# Patient Record
Sex: MALE | Race: WHITE | NOT HISPANIC OR LATINO | Employment: FULL TIME | URBAN - METROPOLITAN AREA
[De-identification: names, ages, dates, MRNs, and addresses within clinical notes are randomized per-mention and may not be internally consistent; named-entity substitution may affect disease eponyms.]

---

## 2019-02-04 ENCOUNTER — OFFICE VISIT (OUTPATIENT)
Dept: GASTROENTEROLOGY | Facility: CLINIC | Age: 49
End: 2019-02-04
Payer: COMMERCIAL

## 2019-02-04 VITALS — DIASTOLIC BLOOD PRESSURE: 82 MMHG | HEART RATE: 69 BPM | SYSTOLIC BLOOD PRESSURE: 122 MMHG | WEIGHT: 227.13 LBS

## 2019-02-04 DIAGNOSIS — K62.5 RECTAL BLEEDING: Primary | ICD-10-CM

## 2019-02-04 PROBLEM — Z12.11 ENCOUNTER FOR SCREENING FOR MALIGNANT NEOPLASM OF COLON: Status: ACTIVE | Noted: 2019-02-04

## 2019-02-04 PROCEDURE — 99244 OFF/OP CNSLTJ NEW/EST MOD 40: CPT | Performed by: INTERNAL MEDICINE

## 2019-02-04 RX ORDER — HYDROCORTISONE ACETATE 25 MG/1
25 SUPPOSITORY RECTAL 2 TIMES DAILY
Qty: 12 SUPPOSITORY | Refills: 0 | Status: SHIPPED | OUTPATIENT
Start: 2019-02-04 | End: 2019-06-04 | Stop reason: SDUPTHER

## 2019-02-04 NOTE — LETTER
February 4, 2019     Temo Perez MD  [address] [facility] [address]    Patient: Mercedes Leyva   YOB: 1970   Date of Visit: 2/4/2019       Dear Dr Katie Lindo:    Thank you for referring Lauraamy Romero to me for evaluation  Below are my notes for this consultation  If you have questions, please do not hesitate to call me  I look forward to following your patient along with you  Sincerely,        Christen Carlos MD        CC: No Recipients  Christen Carlos MD  2/4/2019  2:09 PM  Sign at close encounter  Consultation - [address] [facility]  Mercedes Autumn 1970 51 y o  male     ASSESSMENT @ PLAN:   He is a 58-year-old male with chronic intermittent rectal bleeding secondary to internal hemorrhoids which have been known about for a long time  He has never had a colonoscopy for screening or rectal bleeding  He is here with his wife  1 will do colonoscopy to investigate    2 he will do a high-fiber diet    3 I will give him hydrocortisone suppositories for 2 weeks and I 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    Chief Complaint:   Rectal bleeding and hemorrhoids    HPI:   He is a 58-year-old male that needs a colonoscopy  He has had chronic intermittent rectal bleeding secondary to internal hemorrhoids that time he sits at a desk because he works in [employer]  He is known about hemorrhoids for a long time and knows that it is worsened with chocolate and coffee  He has no diarrhea constipation melena hematochezia nausea vomiting  His father and his sister both had colon polyps  Nobody in the family had Crohn's disease or ulcerative colitis or colon malignancy  REVIEW OF SYSTEMS:     CONSTITUTIONAL: Denies any fever, chills, or rigors  Good appetite, and no recent weight loss  HEENT: No earache or tinnitus  Denies hearing loss or visual disturbances  CARDIOVASCULAR: No chest pain or palpitations     RESPIRATORY: Denies any cough, hemoptysis, shortness of breath or dyspnea on exertion  GASTROINTESTINAL: As noted in the History of Present Illness  GENITOURINARY: No problems with urination  Denies any hematuria or dysuria  NEUROLOGIC: No dizziness or vertigo, denies headaches  MUSCULOSKELETAL: Denies any muscle or joint pain  SKIN: Denies skin rashes or itching  ENDOCRINE: Denies excessive thirst  Denies intolerance to heat or cold  PSYCHOSOCIAL: Denies depression or anxiety  Denies any recent memory loss  History reviewed  No pertinent past medical history  History reviewed  No pertinent surgical history  Social History     Social History    Marital status: Single     Spouse name: N/A    Number of children: N/A    Years of education: N/A     Occupational History    Not on file  Social History Main Topics    Smoking status: Never Smoker    Smokeless tobacco: Never Used    Alcohol use No    Drug use: No    Sexual activity: Not on file     Other Topics Concern    Not on file     Social History Narrative    No narrative on file     Family History   Problem Relation Age of Onset    Diabetes Family      Patient has no known allergies  Current Outpatient Prescriptions   Medication Sig Dispense Refill    hydrocortisone (ANUSOL-HC) 25 mg suppository Insert 1 suppository (25 mg total) into the rectum 2 (two) times a day 12 suppository 0     No current facility-administered medications for this visit  Blood pressure 122/82, pulse 69, weight 103 kg (227 lb 2 oz)      PHYSICAL EXAM:     General Appearance:   Alert, cooperative, no distress, appears stated age    HEENT:   Normocephalic, atraumatic, anicteric      Neck:  Supple, symmetrical, trachea midline, no adenopathy;    thyroid: no enlargement/tenderness/nodules; no carotid  bruit or JVD    Lungs:   Clear to auscultation bilaterally; no rales, rhonchi or wheezing; respirations unlabored    Heart[de-identified]   S1 and S2 normal; regular rate and rhythm; no murmur, rub, or gallop     Abdomen:   Soft, non-tender, non-distended; normal bowel sounds; no masses, no organomegaly    Genitalia:   Deferred    Rectal:   Deferred    Extremities:  No cyanosis, clubbing or edema    Pulses:  2+ and symmetric all extremities    Skin:  Skin color, texture, turgor normal, no rashes or lesions    Lymph nodes:  No palpable cervical, axillary or inguinal lymphadenopathy        No results found for: WBC, HGB, HCT, MCV, PLT  No results found for: GLUCOSE, CALCIUM, NA, K, CO2, CL, BUN, CREATININE  No results found for: ALT, AST, GGT, ALKPHOS, BILITOT  No results found for: INR, PROTIME

## 2019-02-04 NOTE — PROGRESS NOTES
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    Chief Complaint:   Rectal bleeding and hemorrhoids    HPI:   He is a 53-year-old male that needs a colonoscopy  He has had chronic intermittent rectal bleeding secondary to internal hemorrhoids that time he sits at a desk because he works in [employer]  He is known about hemorrhoids for a long time and knows that it is worsened with chocolate and coffee  He has no diarrhea constipation melena hematochezia nausea vomiting  His father and his sister both had colon polyps  Nobody in the family had Crohn's disease or ulcerative colitis or colon malignancy  REVIEW OF SYSTEMS:     CONSTITUTIONAL: Denies any fever, chills, or rigors  Good appetite, and no recent weight loss  HEENT: No earache or tinnitus  Denies hearing loss or visual disturbances  CARDIOVASCULAR: No chest pain or palpitations  RESPIRATORY: Denies any cough, hemoptysis, shortness of breath or dyspnea on exertion  GASTROINTESTINAL: As noted in the History of Present Illness  GENITOURINARY: No problems with urination  Denies any hematuria or dysuria  NEUROLOGIC: No dizziness or vertigo, denies headaches  MUSCULOSKELETAL: Denies any muscle or joint pain  SKIN: Denies skin rashes or itching  ENDOCRINE: Denies excessive thirst  Denies intolerance to heat or cold  PSYCHOSOCIAL: Denies depression or anxiety  Denies any recent memory loss  History reviewed   No pertinent past medical history  History reviewed  No pertinent surgical history  Social History     Social History    Marital status: Single     Spouse name: N/A    Number of children: N/A    Years of education: N/A     Occupational History    Not on file  Social History Main Topics    Smoking status: Never Smoker    Smokeless tobacco: Never Used    Alcohol use No    Drug use: No    Sexual activity: Not on file     Other Topics Concern    Not on file     Social History Narrative    No narrative on file     Family History   Problem Relation Age of Onset    Diabetes Family      Patient has no known allergies  Current Outpatient Prescriptions   Medication Sig Dispense Refill    hydrocortisone (ANUSOL-HC) 25 mg suppository Insert 1 suppository (25 mg total) into the rectum 2 (two) times a day 12 suppository 0     No current facility-administered medications for this visit  Blood pressure 122/82, pulse 69, weight 103 kg (227 lb 2 oz)  PHYSICAL EXAM:     General Appearance:   Alert, cooperative, no distress, appears stated age    HEENT:   Normocephalic, atraumatic, anicteric      Neck:  Supple, symmetrical, trachea midline, no adenopathy;    thyroid: no enlargement/tenderness/nodules; no carotid  bruit or JVD    Lungs:   Clear to auscultation bilaterally; no rales, rhonchi or wheezing; respirations unlabored    Heart[de-identified]   S1 and S2 normal; regular rate and rhythm; no murmur, rub, or gallop     Abdomen:   Soft, non-tender, non-distended; normal bowel sounds; no masses, no organomegaly    Genitalia:   Deferred    Rectal:   Deferred    Extremities:  No cyanosis, clubbing or edema    Pulses:  2+ and symmetric all extremities    Skin:  Skin color, texture, turgor normal, no rashes or lesions    Lymph nodes:  No palpable cervical, axillary or inguinal lymphadenopathy        No results found for: WBC, HGB, HCT, MCV, PLT  No results found for: GLUCOSE, CALCIUM, NA, K, CO2, CL, BUN, CREATININE  No results found for: ALT, AST, GGT, ALKPHOS, BILITOT  No results found for: INR, PROTIME

## 2019-02-15 PROBLEM — K62.5 HEMORRHAGE OF ANUS AND RECTUM: Status: ACTIVE | Noted: 2019-02-04

## 2019-02-18 ENCOUNTER — ANESTHESIA EVENT (OUTPATIENT)
Dept: PERIOP | Facility: HOSPITAL | Age: 49
End: 2019-02-18
Payer: COMMERCIAL

## 2019-02-18 ENCOUNTER — HOSPITAL ENCOUNTER (OUTPATIENT)
Facility: HOSPITAL | Age: 49
Setting detail: OUTPATIENT SURGERY
Discharge: HOME | End: 2019-02-18
Attending: INTERNAL MEDICINE | Admitting: INTERNAL MEDICINE
Payer: COMMERCIAL

## 2019-02-18 ENCOUNTER — ANESTHESIA (OUTPATIENT)
Dept: PERIOP | Facility: HOSPITAL | Age: 49
End: 2019-02-18
Payer: COMMERCIAL

## 2019-02-18 VITALS
HEART RATE: 55 BPM | BODY MASS INDEX: 33.34 KG/M2 | RESPIRATION RATE: 15 BRPM | OXYGEN SATURATION: 96 % | TEMPERATURE: 97.6 F | HEIGHT: 69 IN | WEIGHT: 225.09 LBS | DIASTOLIC BLOOD PRESSURE: 64 MMHG | SYSTOLIC BLOOD PRESSURE: 105 MMHG

## 2019-02-18 DIAGNOSIS — K62.5 HEMORRHAGE OF ANUS AND RECTUM: ICD-10-CM

## 2019-02-18 PROCEDURE — 88305 TISSUE EXAM BY PATHOLOGIST: CPT | Performed by: PATHOLOGY

## 2019-02-18 PROCEDURE — 45385 COLONOSCOPY W/LESION REMOVAL: CPT | Performed by: INTERNAL MEDICINE

## 2019-02-18 RX ORDER — PROPOFOL 10 MG/ML
INJECTION, EMULSION INTRAVENOUS AS NEEDED
Status: DISCONTINUED | OUTPATIENT
Start: 2019-02-18 | End: 2019-02-18 | Stop reason: SURG

## 2019-02-18 RX ORDER — SODIUM CHLORIDE, SODIUM LACTATE, POTASSIUM CHLORIDE, CALCIUM CHLORIDE 600; 310; 30; 20 MG/100ML; MG/100ML; MG/100ML; MG/100ML
125 INJECTION, SOLUTION INTRAVENOUS CONTINUOUS
Status: DISCONTINUED | OUTPATIENT
Start: 2019-02-18 | End: 2019-02-18 | Stop reason: HOSPADM

## 2019-02-18 RX ADMIN — SODIUM CHLORIDE, SODIUM LACTATE, POTASSIUM CHLORIDE, AND CALCIUM CHLORIDE 125 ML/HR: .6; .31; .03; .02 INJECTION, SOLUTION INTRAVENOUS at 10:08

## 2019-02-18 RX ADMIN — PROPOFOL 150 MG: 10 INJECTION, EMULSION INTRAVENOUS at 10:20

## 2019-02-18 RX ADMIN — PROPOFOL 50 MG: 10 INJECTION, EMULSION INTRAVENOUS at 10:24

## 2019-02-18 NOTE — ANESTHESIA PREPROCEDURE EVALUATION
Review of Systems/Medical History  Patient summary reviewed  Chart reviewed  No history of anesthetic complications     Cardiovascular  Exercise tolerance (METS): >4,     Pulmonary       GI/Hepatic            Endo/Other    Obesity    GYN       Hematology   Musculoskeletal       Neurology   Psychology           Physical Exam    Airway    Mallampati score: III  TM Distance: >3 FB  Neck ROM: full     Dental   No notable dental hx     Cardiovascular      Pulmonary      Other Findings        Anesthesia Plan  ASA Score- 2     Anesthesia Type- IV sedation with anesthesia with ASA Monitors  Additional Monitors:   Airway Plan:         Plan Factors-    Induction- intravenous  Postoperative Plan-     Informed Consent- Anesthetic plan and risks discussed with patient  I personally reviewed this patient with the CRNA  Discussed and agreed on the Anesthesia Plan with the LEN Francisco

## 2019-02-18 NOTE — OP NOTE
COLONOSCOPY    PROCEDURE: Colonoscopy/ Polypectomy (Cold Snare)    INDICATIONS: Rectal Bleeding    POST-OP DIAGNOSIS: See the impression below    SEDATION: Monitored anesthesia care, check anesthesia records    PHYSICAL EXAM:  Vitals:    02/18/19 0950   BP: 136/78   Pulse: 61   Resp: 20   Temp: 98 3 °F (36 8 °C)   SpO2: 97%     Body mass index is 33 24 kg/m²  General: NAD  Heart: S1 & S2 normal, RRR  Lungs: CTA, No rales or rhonchi  Abdomen: Soft, nontender, nondistended, good bowel sounds    CONSENT:  Informed consent was obtained for the procedure, including sedation after explaining the risks and benefits of the procedure  Risks including but not limited to bleeding, perforation, infection, aspiration were discussed in detail  Also explained about less than 100%$ sensitivity with the exam and other alternatives  PREPARATION:   EKG tracing, pulse oximetry, blood pressure were monitored throughout the procedure  Patient was identified by myself both verbally and by visual inspection of ID band  DESCRIPTION:   Patient was placed in the left lateral decubitus position and was sedated with the above medication  Digital rectal examination was performed  The colonoscope was introduced in to the anal canal and advanced up to cecum, which was identified by the appendiceal orifice and IC valve  A careful inspection was made as the colonoscope was withdrawn, including a retroflexed view of the rectum; findings and interventions are described below  Appropriate photodocumentation was obtained  The quality of the colonic preparation was excellent  The blood loss was minimal     FINDINGS:  A 1 cm sessile polyp was removed from the sigmoid colon with cold snare polypectomy  Sigmoid diverticulosis was noted  The cecum ascending colon hepatic flexure transverse colon splenic flexure descending colon and rectum were normal   Internal hemorrhoids were noted on retroflexion           IMPRESSIONS:    Polyp status post cold snare polypectomy  Sigmoid diverticulosis  Internal hemorrhoid    RECOMMENDATIONS:  Await pathology  High-fiber diet  Repeat colonoscopy in 5 years    COMPLICATIONS:  None; patient tolerated the procedure well    DISPOSITION: PACU  CONDITION: Stable    Iris Leblanc MD  2/18/2019,10:31 AM

## 2019-02-18 NOTE — ANESTHESIA POSTPROCEDURE EVALUATION
Post-Op Assessment Note    CV Status:  Stable  Pain Score: 0    Pain management: adequate     Mental Status:  Sleepy   Hydration Status:  Stable   PONV Controlled:  None   Airway Patency:  Patent   Post Op Vitals Reviewed: Yes      Staff: CRNA           BP   103/54   Temp      Pulse  54   Resp   20   SpO2   99

## 2019-02-18 NOTE — DISCHARGE INSTR - AVS FIRST PAGE
COLONOSCOPY    PROCEDURE: Colonoscopy/ Polypectomy (Cold Snare)    INDICATIONS: Rectal Bleeding    POST-OP DIAGNOSIS: See the impression below    SEDATION: Monitored anesthesia care, check anesthesia records    PHYSICAL EXAM:  Vitals:    02/18/19 0950   BP: 136/78   Pulse: 61   Resp: 20   Temp: 98 3 °F (36 8 °C)   SpO2: 97%     Body mass index is 33 24 kg/m²  General: NAD  Heart: S1 & S2 normal, RRR  Lungs: CTA, No rales or rhonchi  Abdomen: Soft, nontender, nondistended, good bowel sounds    CONSENT:  Informed consent was obtained for the procedure, including sedation after explaining the risks and benefits of the procedure  Risks including but not limited to bleeding, perforation, infection, aspiration were discussed in detail  Also explained about less than 100%$ sensitivity with the exam and other alternatives  PREPARATION:   EKG tracing, pulse oximetry, blood pressure were monitored throughout the procedure  Patient was identified by myself both verbally and by visual inspection of ID band  DESCRIPTION:   Patient was placed in the left lateral decubitus position and was sedated with the above medication  Digital rectal examination was performed  The colonoscope was introduced in to the anal canal and advanced up to cecum, which was identified by the appendiceal orifice and IC valve  A careful inspection was made as the colonoscope was withdrawn, including a retroflexed view of the rectum; findings and interventions are described below  Appropriate photodocumentation was obtained  The quality of the colonic preparation was excellent  The blood loss was minimal     FINDINGS:  A 1 cm sessile polyp was removed from the sigmoid colon with cold snare polypectomy  Sigmoid diverticulosis was noted  The cecum ascending colon hepatic flexure transverse colon splenic flexure descending colon and rectum were normal   Internal hemorrhoids were noted on retroflexion           IMPRESSIONS:    Polyp status post cold snare polypectomy  Sigmoid diverticulosis  Internal hemorrhoid    RECOMMENDATIONS:  Await pathology  High-fiber diet  Repeat colonoscopy in 5 years    COMPLICATIONS:  None; patient tolerated the procedure well    DISPOSITION: PACU  CONDITION: Stable    Rosio Morales MD  2/18/2019,10:31 AM

## 2019-02-18 NOTE — DISCHARGE INSTRUCTIONS
Colonoscopy   WHAT YOU NEED TO KNOW:   A colonoscopy is a procedure to examine the inside of your colon (intestine) with a scope  Polyps or tissue growths may have been removed during your colonoscopy  It is normal to feel bloated and to have some abdominal discomfort  You should be passing gas  If you have hemorrhoids or you had polyps removed, you may have a small amount of bleeding  DISCHARGE INSTRUCTIONS:   Seek care immediately if:   · You have a large amount of bright red blood in your bowel movements  · Your abdomen is hard and firm and you have severe pain  · You have sudden trouble breathing  Contact your healthcare provider if:   · You develop a rash or hives  · You have a fever within 24 hours of your procedure       · You have not had a bowel movement for 3 days after your procedure  · You have questions or concerns about your condition or care  Activity:   · Do not lift, strain, or run  for 3 days after your procedure  · Rest after your procedure  You have been given medicine to relax you  Do not  drive or make important decisions until the day after your procedure  Return to your normal activity as directed  · Relieve gas and discomfort from bloating  by lying on your right side with a heating pad on your abdomen  You may need to take short walks to help the gas move out  Eat small meals until bloating is relieved  If you had polyps removed: For 7 days after your procedure:  · Do not  take aspirin  · Do not  go on long car rides  Follow up with your healthcare provider as directed:  Write down your questions so you remember to ask them during your visits  © 2017 [employer] is for End User's use only and may not be sold, redistributed or otherwise used for commercial purposes  All illustrations and images included in CareNotes® are the copyrighted property of [employer]  or Juan Luis [employer]ue[employer]    The above information is an  only  It is not intended as medical advice for individual conditions or treatments  Talk to your doctor, nurse or pharmacist before following any medical regimen to see if it is safe and effective for you

## 2019-02-21 ENCOUNTER — TELEPHONE (OUTPATIENT)
Dept: GASTROENTEROLOGY | Facility: CLINIC | Age: 49
End: 2019-02-21

## 2019-02-21 NOTE — TELEPHONE ENCOUNTER
----- Message from Jimena Hernandez MD sent at 2/21/2019 12:03 PM EST -----  pls tell him that the polyp was benign; recall 5yrs

## 2019-04-05 ENCOUNTER — TELEPHONE (OUTPATIENT)
Dept: GASTROENTEROLOGY | Facility: CLINIC | Age: 49
End: 2019-04-05

## 2019-06-04 DIAGNOSIS — K62.5 RECTAL BLEEDING: ICD-10-CM

## 2019-06-04 RX ORDER — HYDROCORTISONE ACETATE 25 MG/1
25 SUPPOSITORY RECTAL 2 TIMES DAILY
Qty: 12 SUPPOSITORY | Refills: 0 | Status: SHIPPED | OUTPATIENT
Start: 2019-06-04 | End: 2021-12-17 | Stop reason: SDUPTHER

## 2021-12-17 DIAGNOSIS — K62.5 RECTAL BLEEDING: ICD-10-CM

## 2021-12-17 RX ORDER — HYDROCORTISONE ACETATE 25 MG/1
25 SUPPOSITORY RECTAL 2 TIMES DAILY
Qty: 12 SUPPOSITORY | Refills: 0 | Status: SHIPPED | OUTPATIENT
Start: 2021-12-17

## 2022-07-06 ENCOUNTER — OFFICE VISIT (OUTPATIENT)
Dept: CARDIOLOGY CLINIC | Facility: HOSPITAL | Age: 52
End: 2022-07-06
Payer: COMMERCIAL

## 2022-07-06 VITALS
WEIGHT: 219 LBS | DIASTOLIC BLOOD PRESSURE: 82 MMHG | BODY MASS INDEX: 30.66 KG/M2 | HEIGHT: 71 IN | SYSTOLIC BLOOD PRESSURE: 120 MMHG | HEART RATE: 62 BPM

## 2022-07-06 DIAGNOSIS — Z91.89 CARDIOVASCULAR RISK FACTOR: Primary | ICD-10-CM

## 2022-07-06 DIAGNOSIS — I44.5 LEFT POSTERIOR FASCICULAR BLOCK: ICD-10-CM

## 2022-07-06 PROCEDURE — 93000 ELECTROCARDIOGRAM COMPLETE: CPT | Performed by: INTERNAL MEDICINE

## 2022-07-06 PROCEDURE — 99243 OFF/OP CNSLTJ NEW/EST LOW 30: CPT | Performed by: INTERNAL MEDICINE

## 2022-07-06 NOTE — PROGRESS NOTES
Javier Hendrix  1970  [id number]  [facility] [place]  [address] [place] [address] Kristina Smeet  [place]  [place] [address]  [phone]    1  Cardiovascular risk factor  POCT ECG    Echo complete w/ contrast if indicated    VAS screening   2  Left posterior fascicular block  POCT ECG    Echo complete w/ contrast if indicated    VAS screening       Discussion/Summary:  He presents today to establish care to talk about his cardiovascular risk  He underwent a lifeline screening which showed abnormalities on EKG  He has a left posterior fascicular block that is asymptomatic  I would recommend an echocardiogram to rule out any underlying structural heart disease  I have also offered him a vascular screening to check carotids and rule out abdominal aortic aneurysm due to family history of this  Lipids are doing well blood pressure is well controlled continue with diet and exercise will follow-up at 1 year  Interval History:  58-year-old gentleman with no significant past medical history presents to establish with me for cardiovascular risk assessment  I took care of his father several years ago and he had a history of valvular disease and AAA and the patient wanted to have his own risk factors for these assessed  He has an excellent functional capacity denies any exertional symptoms  There has been no chest pain, shortness of breath, palpitations, lightheadedness, dizziness, or syncope  There has been no lower extremity edema, PND, orthopnea      Medical Problems             Problem List     Rectal bleeding    Encounter for screening for malignant neoplasm of colon    Overview Signed 2/4/2019  2:12 PM by Yosvany Lee     Added automatically from request for surgery [id number]           Hemorrhage of anus and rectum    Overview Signed 2/15/2019 11:45 AM by Yosvany Lee     Added automatically from request for surgery [id number]           Cardiovascular risk factor    Left posterior fascicular block              History reviewed  No pertinent past medical history  Social History     Socioeconomic History    Marital status: /Civil Union     Spouse name: Not on file    Number of children: Not on file    Years of education: Not on file    Highest education level: Not on file   Occupational History    Not on file   Tobacco Use    Smoking status: Never Smoker    Smokeless tobacco: Never Used   Substance and Sexual Activity    Alcohol use: No    Drug use: No    Sexual activity: Not on file   Other Topics Concern    Not on file   Social History Narrative    Not on file     Social Determinants of Health     Financial Resource Strain: Not on file   Food Insecurity: Not on file   Transportation Needs: Not on file   Physical Activity: Not on file   Stress: Not on file   Social Connections: Not on file   Intimate Partner Violence: Not on file   Housing Stability: Not on file      Family History   Problem Relation Age of Onset    Diabetes Family      Past Surgical History:   Procedure Laterality Date    AK COLONOSCOPY FLX DX W/COLLJ [facility] WHEN PFRMD N/A 2/18/2019    Procedure: COLONOSCOPY;  Surgeon: Rahat Cristina MD;  Location: [facility]; Service: Gastroenterology       Current Outpatient Medications:     hydrocortisone (ANUSOL-HC) 25 mg suppository, Insert 1 suppository (25 mg total) into the rectum 2 (two) times a day, Disp: 12 suppository, Rfl: 0  No Known Allergies    Labs:     Chemistry    No results found for: NA, K, CL, CO2, BUN, CREATININE No results found for: CALCIUM, ALKPHOS, AST, ALT, BILITOT         No results found for: CHOL  No results found for: HDL  No results found for: LDLCALC  No results found for: TRIG  No results found for: CHOLHDL    Imaging: No results found  ECG:  Normal sinus rhythm left posterior fascicular block      Review of Systems   Constitutional: Negative  HENT: Negative  Eyes: Negative  Cardiovascular: Negative  Respiratory: Negative  Endocrine: Negative  Hematologic/Lymphatic: Negative  Skin: Negative  Musculoskeletal: Negative  Gastrointestinal: Negative  Genitourinary: Negative  Neurological: Negative  Psychiatric/Behavioral: Negative  All other systems reviewed and are negative  Vitals:    07/06/22 1458   BP: 120/82   Pulse: 62     Vitals:    07/06/22 1458   Weight: 99 3 kg (219 lb)     Height: 5' 11" (180 3 cm)   Body mass index is 30 54 kg/m²  Physical Exam:  Vital signs reviewed  General appearance:  Appears stated age, alert, well appearing and in no distress  HEENT:  PERRLA, EOMI, no scleral icterus, no conjunctival pallor  NECK:  Supple, No elevated JVP, no thyromegaly, no carotid bruits, no JVD  HEART:  Regular rate and rhythm, normal S1/S2, no S3/S4, no murmur or rub, PMI nondisplaced  LUNGS:  Clear to auscultation bilaterally, no wheezes rales or rhonchi  ABDOMEN:  Soft, non-tender, positive bowel sounds, no rebound or guarding, no organomegaly   EXTREMITIES:  Normal range of motion  No clubbing or cyanosis   No edema  VASCULAR:  Normal pedal pulses   SKIN: No lesions or rashes on exposed skin  NEURO:  CN II-XII intact, no focal deficits

## 2022-07-21 ENCOUNTER — HOSPITAL ENCOUNTER (OUTPATIENT)
Dept: NON INVASIVE DIAGNOSTICS | Facility: CLINIC | Age: 52
Discharge: HOME | End: 2022-07-21
Payer: COMMERCIAL

## 2022-07-21 VITALS
BODY MASS INDEX: 30.66 KG/M2 | HEART RATE: 57 BPM | DIASTOLIC BLOOD PRESSURE: 82 MMHG | WEIGHT: 219 LBS | HEIGHT: 71 IN | SYSTOLIC BLOOD PRESSURE: 120 MMHG

## 2022-07-21 DIAGNOSIS — Z91.89 CARDIOVASCULAR RISK FACTOR: ICD-10-CM

## 2022-07-21 DIAGNOSIS — I44.5 LEFT POSTERIOR FASCICULAR BLOCK: ICD-10-CM

## 2022-07-21 LAB
AORTIC ROOT: 3 CM
APICAL FOUR CHAMBER EJECTION FRACTION: 64 %
AV LVOT PEAK GRADIENT: 6 MMHG
AV PEAK GRADIENT: 7 MMHG
E WAVE DECELERATION TIME: 234 MS
FRACTIONAL SHORTENING: 35 % (ref 28–44)
INTERVENTRICULAR SEPTUM IN DIASTOLE (PARASTERNAL SHORT AXIS VIEW): 0.9 CM
INTERVENTRICULAR SEPTUM: 0.9 CM (ref 0.6–1.1)
LAAS-AP2: 19.3 CM2
LAAS-AP4: 16.9 CM2
LEFT ATRIUM AREA SYSTOLE SINGLE PLANE A4C: 17.2 CM2
LEFT ATRIUM SIZE: 3.4 CM
LEFT INTERNAL DIMENSION IN SYSTOLE: 3 CM (ref 2.1–4)
LEFT VENTRICULAR INTERNAL DIMENSION IN DIASTOLE: 4.6 CM (ref 3.5–6)
LEFT VENTRICULAR POSTERIOR WALL IN END DIASTOLE: 0.8 CM
LEFT VENTRICULAR STROKE VOLUME: 64 ML
LVSV (TEICH): 64 ML
MITRAL REGURGITATION PEAK VELOCITY: 5.18 M/S
MITRAL VALVE MEAN INFLOW VELOCITY: 4.47 M/S
MITRAL VALVE REGURGITANT PEAK GRADIENT: 107 MMHG
MV E'TISSUE VEL-LAT: 10 CM/S
MV PEAK A VEL: 0.88 M/S
MV PEAK E VEL: 114 CM/S
MV STENOSIS PRESSURE HALF TIME: 68 MS
MV VALVE AREA P 1/2 METHOD: 3.24 CM2
RIGHT ATRIUM AREA SYSTOLE A4C: 16 CM2
RIGHT VENTRICLE ID DIMENSION: 3.8 CM
SL CV DOP CALC MV VTI RETROGRADE: 219.5 CM
SL CV LEFT ATRIUM LENGTH A2C: 5.3 CM
SL CV LV EF: 60
SL CV MV MEAN GRADIENT RETROGRADE: 85 MMHG
SL CV PED ECHO LEFT VENTRICLE DIASTOLIC VOLUME (MOD BIPLANE) 2D: 99 ML
SL CV PED ECHO LEFT VENTRICLE SYSTOLIC VOLUME (MOD BIPLANE) 2D: 35 ML

## 2022-07-21 PROCEDURE — 93306 TTE W/DOPPLER COMPLETE: CPT

## 2022-07-21 PROCEDURE — 93306 TTE W/DOPPLER COMPLETE: CPT | Performed by: INTERNAL MEDICINE

## 2022-07-28 ENCOUNTER — HOSPITAL ENCOUNTER (OUTPATIENT)
Dept: VASCULAR ULTRASOUND | Facility: HOSPITAL | Age: 52
Discharge: HOME | End: 2022-07-28
Payer: COMMERCIAL

## 2022-07-28 DIAGNOSIS — Z91.89 CARDIOVASCULAR RISK FACTOR: ICD-10-CM

## 2022-07-28 DIAGNOSIS — I44.5 LEFT POSTERIOR FASCICULAR BLOCK: ICD-10-CM

## 2022-07-28 PROCEDURE — 93978 VASCULAR STUDY: CPT | Performed by: SURGERY

## 2022-07-28 PROCEDURE — 93922 UPR/L XTREMITY ART 2 LEVELS: CPT | Performed by: SURGERY

## 2022-07-28 PROCEDURE — 93922 UPR/L XTREMITY ART 2 LEVELS: CPT

## 2022-07-28 PROCEDURE — 93880 EXTRACRANIAL BILAT STUDY: CPT | Performed by: SURGERY

## 2023-04-14 PROBLEM — R42 DIZZINESS: Status: ACTIVE | Noted: 2023-04-14

## 2023-04-14 PROBLEM — R42 LIGHTHEADED: Status: ACTIVE | Noted: 2023-04-14

## 2023-05-16 ENCOUNTER — CLINICAL SUPPORT (OUTPATIENT)
Dept: CARDIOLOGY CLINIC | Facility: CLINIC | Age: 53
End: 2023-05-16

## 2023-05-16 ENCOUNTER — TELEPHONE (OUTPATIENT)
Dept: CARDIOLOGY CLINIC | Facility: CLINIC | Age: 53
End: 2023-05-16

## 2023-05-16 DIAGNOSIS — I44.5 LEFT POSTERIOR FASCICULAR BLOCK: ICD-10-CM

## 2023-05-16 DIAGNOSIS — R42 LIGHTHEADED: ICD-10-CM

## 2023-05-16 DIAGNOSIS — R42 DIZZINESS: ICD-10-CM

## 2023-07-25 ENCOUNTER — OFFICE VISIT (OUTPATIENT)
Dept: NEUROLOGY | Facility: CLINIC | Age: 53
End: 2023-07-25
Payer: COMMERCIAL

## 2023-07-25 VITALS
BODY MASS INDEX: 29.96 KG/M2 | HEART RATE: 67 BPM | WEIGHT: 214 LBS | HEIGHT: 71 IN | DIASTOLIC BLOOD PRESSURE: 70 MMHG | SYSTOLIC BLOOD PRESSURE: 110 MMHG

## 2023-07-25 DIAGNOSIS — R42 DIZZINESSES: Primary | ICD-10-CM

## 2023-07-25 PROCEDURE — 99204 OFFICE O/P NEW MOD 45 MIN: CPT | Performed by: PSYCHIATRY & NEUROLOGY
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
## 2023-10-30 ENCOUNTER — EVALUATION (OUTPATIENT)
Age: 53
End: 2023-10-30
Payer: COMMERCIAL

## 2023-10-30 DIAGNOSIS — R42 DIZZINESSES: Primary | ICD-10-CM

## 2023-10-30 PROCEDURE — 97166 OT EVAL MOD COMPLEX 45 MIN: CPT

## 2023-10-30 PROCEDURE — 97530 THERAPEUTIC ACTIVITIES: CPT

## 2023-10-30 NOTE — PROGRESS NOTES
OCCUPATIONAL THERAPY INITIAL EVALUATION    Today's Date: 10/30/2023  Patient Name: Juvenal Stroud  : 1970  MRN: [id number]  Referring Provider: Yisel Marquez DO  Dx: Dizzinesses [R42]    SKILLED ANALYSIS:  Pt is a 47year old male presenting to OP OT s/p dizziness with unknown cause. Pt currently reports difficulty with dizziness moderately effecting his everyday routine. He states the dizziness has significantly decreased since onset in 2022 post COVID 19 diagnoses. Pt reports dizziness a few times per month compared to daily in prior months. Pt participating in OT eval due to lazy eye beginning approximately 30 years and states his eyes have adapted to to complete daily routines independently. Pt is demonstrating deficits based on clinical observation and the following assessments: cross cover test: exophoria, oculomotor coordination. Post assessments pt demonstrating the following: exophoria, min slow saccadic eye movements. OP OT not recommended at this time due to patient's ability to complete full time work tasks, daily routines independently and min impaired vision screen. Oculomotor coordination HEP provided and practiced to complete 2x's per day everyday of the week. Findings and recommendations discussed with pt, and they are in agreement. Referral to PT vestibular therapy. Educated pt on charges of insurance and OP OT services. POC expires Auth Status Total   Visits  Start date  Expiration date PT/OT + Visit Limit?  Co-Insurance   10/30/23 Not required  10/30/23  90 Yes 30%                                           Visit/Unit Tracking  AUTH Status: not required Date 10/30              Visits  Authed:  Used 1               Remaining                  PLAN OF CARE START:10/30/23  PLAN OF CARE END: 10/30/2023  PROGRESS NOTE DUE: n/a  FREQUENCY: n/a  PRECAUTIONS n/a  DIAGNOSIS: Dizziness  VISITS: 1 (eval)    Subjective    Mechanism of Injury  As per recent neurology visit, "Patient is a 68-year-old male who presents to the clinic today as a result of dizziness. At this time, patient has already been to cardiology, ENT, and optometry. All cardiac work-up has been pretty much negative for any underlying cardiovascular risk factors. Patient has a significant past medical history of a left lazy eye and COVID in January which kind of prompted the initiation of the dizziness. On neurological examination, patient's exam is nonfocal except for mild Romberg sign and unable to walk in tandem. During this visit, orthostatic vitals were also obtained and they were positive. Considering patient's extensive work-up, nonfocal examination, and positive orthostatic vitals, the dizziness that the patient is experiencing seems to be more dysautonomia secondary to his COVID and his lazy eye. Since patient works from home and is always looking at a computer screen, patient recommended to get occupational therapy for eye exercises along with adequate hydration and nutritional intake."    PATIENT GOAL: "To improve dizziness"    HISTORY OF PRESENT ILLNESS:   Pt is a 47 y.o. male who was referred to Occupational Therapy s/p  Ladarius [R42]. PMH: No past medical history on file. Past Surgical Hx:   Past Surgical History:   Procedure Laterality Date    [place] COLONOSCOPY FLX DX W/COLLJ SPEC WHEN PFRMD N/A 2/18/2019    Procedure: COLONOSCOPY;  Surgeon: Fito Bourgeois MD;  Location: [facility];   Service: Gastroenterology        Objective        Impairment Observations:     Comments                                        VISION SCREEN         Progressive glasses          Symptoms Include Mild inconsistent dizziness, tinnitus    Last Eye Exam January 2023           Visual Acuity (near) R eye  20/50    L eye 20/40    Binocularity (far) Exophoria     Convergence  Diplopia reported at 6 inches, recovery at 5 inches     Unipower Battery             Alignment  OD suppression, exo posture    Mobility             Pursuits smooth in all planes            Saccades smooth in all planes, min slowness            Range of Motion [facility]    Visual perceptual midline             Midline [facility]            [facility]  [facility]

## 2023-11-07 ENCOUNTER — EVALUATION (OUTPATIENT)
Age: 53
End: 2023-11-07

## 2023-11-07 DIAGNOSIS — R42 DIZZINESS: Primary | ICD-10-CM

## 2023-11-07 PROCEDURE — 97161 PT EVAL LOW COMPLEX 20 MIN: CPT | Performed by: PHYSICAL THERAPIST

## 2023-11-07 NOTE — PROGRESS NOTES
PT Evaluation                     Today's date: 2023  Patient name: Colletta Jericho  : 1970  MRN: [id number]  Referring provider: Girish Curiel DO  Dx:   Encounter Diagnosis     ICD-10-CM    1. Dizziness  R42             Assessment  Assessment details: Patient is a 47 y.o. Male who presents to skilled outpatient PT with dizziness. Upon examination patient presents with normal finding for positional testing and vestibular testing. Recommend possible VNG to look at the nerve it self. Therapy is not indicated at this time. Pt agreeable to PT recommendation    Patient verbalized understanding of POC. Please contact me if you have any questions or recommendations. Thank you for the referral and the opportunity to share in [address] [place] [address]. Subjective Evaluation    History of Present Illness  Mechanism of injury: Pt reports having dizziness 11 months ago that started post COVID. Had it 3 times a day. Has followed up with ENT, Cardio and Neuro. Pt reports findings from those specialist noted normal function. Saw neuro MD last week, had lazy eye when growing up with referral vision therapy. OT referred to PT, with dizziness once a week if that with random activity.        Dizziness Subjective  How long does dizziness last: 5 minutes   How would you describe the dizziness: passing out sensation, white out effect  Rolling in bed: No  Supine to/from sit: No  Recent hearing loss: No  Tinnitus: Yes  Aural fullness/ear pain: No  Vision changes: Yes  History of recent viral infections: Yes  History of migraines: No    Red Flag Screen  - Numbness: No  - Tingling: No  - Weakness: No  - Unilateral hearing loss: No  - Slurred speech: No  - Progressive hearing loss: No  - Tremors: No  - Poor coordination: No  - UMN signs: No  - LoC: No  - Rigidity: No  - Visual field loss: No  - Memory loss: No  - CN dysfunction: No  - Vertical nystagmus: No    Pain  Current pain ratin/10  At best pain ratin/10  At worst pain ratin/10  Location: na  Aggravating factors: na    Social Support  Employment status: computer work   Hand dominance: right     Treatments  Previous treatment: OT screen with normal findings   Current treatment: none  Diagnostic Testing: na      Objective     Vestibular Objective  Cervical Spine AROM:  - Flexion: WFL no pain  - Extension: WFL no pain  - R Rotation: WFL no pain  - L Rotation: WFL no pain  - R Lateral Flexion: WFL no pain  - L Lateral Flexion: WFL no pain    Integrity Testing  - mVBI: denies any passing out sensation   - Sharp Tegan: intact   - Alar Stability Test: intact     Oculomotor Screen  - Baseline Symptoms: 0/10  - Baseline Observation: negative   - Gaze Holding Nystagmus: H: Normal Dizziness: 0/10, Observation: WNL  - Spontaneous Nystagmus Room Light: H: Normal Dizziness: 0/10, Observation: WNL  - Smooth Pursuits (central): H: Normal and V: Normal Dizziness: 0/10, Observation: WNL  - Saccades (central): H: Normal and V: Normal Dizziness: 0/10, Observation: WNL  - Near Point Convergence (normal: < 4"/10 cm - central): H: Abnormal Dizziness: 0/10, Observation: right eye unable to coordinate   - VORx1: H: Normal and V: Normal Dizziness: 0/10, Observation: noted physical sway for 1-2 seconds but able to maintain eye contact   - VOR Cancel (central): H: Normal and V: Normal Dizziness: 0/10, Observation: WNL  - Head Thrust (moderate to severe hypofunction): H: Normal Dizziness: 0/10, Observation: able to maintain focus   - Head Shaking Test (mild hypofunction): H: Normal Dizziness: 0/10, Observation: WNL     BPPV Screen  - L Orefield-Hallpike: negative x 2   - R Orefield-Hallpike: negative x 2   - L Log Roll: negative x 2   - R Log Roll: negative x 2        Outcome Measures Initial Eval  2023        mCTSIB  - FTEO (firm)  - FTEC (firm)  - FTEO (foam)  - FTEC (foam)   30 sec  30 sec  30 sec  30 sec                 FGA 26/30        [address] 12/100        JPET                                                           Precautions: standard   No past medical history on file.

## 2023-11-16 ENCOUNTER — TELEPHONE (OUTPATIENT)
Dept: NEUROLOGY | Facility: CLINIC | Age: 53
End: 2023-11-16

## 2023-11-16 NOTE — TELEPHONE ENCOUNTER
Pt called in asking for Dr. Gemini Anthony to place an order for a CT scan or whatever test she recommended. Pt has seen PT and OT. Please assist and call pt when its placed please.

## 2023-11-17 NOTE — TELEPHONE ENCOUNTER
received vm from 11/15 at 2:43pm- GIA Gonzalez, this is Negrita Brady, date of birth 1970. I had a, a, a initial meeting with Dr. Bessy Patel a couple of months ago. Now I followed up with OT and PT and I'm still having the symptoms. So I just wanted to talk to somebody about next steps. I believe they had mentioned, possibly a CT scan to get a baseline brain scan. You know, after I went to the PT/OT assessment, so give me a call back just to just wanted to inquire about that at [phone]. It's Negrita Brady at [phone].  Thanks.  --------------------------------------------  Please advise

## 2023-11-23 DIAGNOSIS — R42 DIZZINESS: Primary | ICD-10-CM

## 2023-12-04 ENCOUNTER — PREP FOR PROCEDURE (OUTPATIENT)
Dept: GASTROENTEROLOGY | Facility: CLINIC | Age: 53
End: 2023-12-04

## 2023-12-04 ENCOUNTER — OFFICE VISIT (OUTPATIENT)
Dept: CARDIOLOGY CLINIC | Facility: CLINIC | Age: 53
End: 2023-12-04
Payer: COMMERCIAL

## 2023-12-04 ENCOUNTER — TELEPHONE (OUTPATIENT)
Dept: GASTROENTEROLOGY | Facility: CLINIC | Age: 53
End: 2023-12-04

## 2023-12-04 VITALS
BODY MASS INDEX: 30.66 KG/M2 | HEIGHT: 71 IN | OXYGEN SATURATION: 98 % | DIASTOLIC BLOOD PRESSURE: 72 MMHG | HEART RATE: 60 BPM | SYSTOLIC BLOOD PRESSURE: 118 MMHG | WEIGHT: 219 LBS

## 2023-12-04 DIAGNOSIS — Z91.89 CARDIOVASCULAR RISK FACTOR: ICD-10-CM

## 2023-12-04 DIAGNOSIS — R42 DIZZINESS: ICD-10-CM

## 2023-12-04 DIAGNOSIS — I44.5 LEFT POSTERIOR FASCICULAR BLOCK: Primary | ICD-10-CM

## 2023-12-04 DIAGNOSIS — Z12.11 SCREENING FOR COLON CANCER: Primary | ICD-10-CM

## 2023-12-04 PROCEDURE — 93000 ELECTROCARDIOGRAM COMPLETE: CPT | Performed by: INTERNAL MEDICINE

## 2023-12-04 PROCEDURE — 99214 OFFICE O/P EST MOD 30 MIN: CPT | Performed by: INTERNAL MEDICINE

## 2023-12-04 NOTE — TELEPHONE ENCOUNTER
12/04/23  Screened by: Rose Pang MA    Referring Provider recall    Pre- Screening: There is no height or weight on file to calculate BMI. Has patient been referred for a routine screening Colonoscopy? yes  Is the patient between 43-73 years old? yes      Previous Colonoscopy yes   If yes:    Date: 5 years ago    Facility:     Reason:       SCHEDULING STAFF: If the patient is between 39yrs-51yrs, please advise patient to confirm benefits/coverage with their insurance company for a routine screening colonoscopy, some insurance carriers will only cover at [address] [place] [address] or older. If the patient is over 66years old, please schedule an office visit. Does the patient want to see a Gastroenterologist prior to their procedure OR are they having any GI symptoms? no    Has the patient been hospitalized or had abdominal surgery in the past 6 months? no    Does the patient use supplemental oxygen? no    Does the patient take Coumadin, Lovenox, Plavix, Elliquis, Xarelto, or other blood thinning medication? no    Has the patient had a stroke, cardiac event, or stent placed in the past year? no    SCHEDULING STAFF: If patient answers NO to above questions, then schedule procedure. If patient answers YES to above questions, then schedule office appointment. If patient is between 45yrs - 49yrs, please advise patient that we will have to confirm benefits & coverage with their insurance company for a routine screening colonoscopy.

## 2023-12-04 NOTE — TELEPHONE ENCOUNTER
Scheduled date of colonoscopy (as of today): 02/19/24  Physician performing colonoscopy: magaly  Location of colonoscopy: [facility]  Bowel prep reviewed with patient: STANTON/ NAZIA  Instructions reviewed with patient by: EDUAR  Clearances: NONE

## 2023-12-04 NOTE — PROGRESS NOTES
Brandi Flavia  1970  [id number]  [address] [place] [address] [facility] [place] [address]  [phone]    1. Left posterior fascicular block  POCT ECG      2. Cardiovascular risk factor  POCT ECG      3. Dizziness            Discussion/Summary: Been doing well since visit. Lightheadedness and dizziness has completely resolved I think this is mostly due to low blood pressure. He is hydrating well event recorder did not show any significant dysrhythmias. Blood pressure well controlled lipids have been doing well vascular screening was reviewed. No further testing I will see him back in 12 months if he needs a follow-up. Interval History:  50-year-old gentleman with no significant past medical history presents to establish with me for cardiovascular risk assessment. I took care of his father several years ago and he had a history of valvular disease and AAA and the patient wanted to have his own risk factors for these assessed. Overall doing great since her last visit. He remains quite physically active. Denies any chest pain, shortness of breath, palpitations, lightheadedness, dizziness, or fever. There is been no lower extremity edema, PND, orthopnea. Event recorder did not show any abnormalities he had several episodes of lightheadedness while wearing it. Medical Problems                 Problem List       Rectal bleeding    Encounter for screening for malignant neoplasm of colon    Overview Signed 2/4/2019  2:12 PM by Nancy Eric     Added automatically from request for surgery [id number]           Hemorrhage of anus and rectum    Overview Signed 2/15/2019 11:45 AM by Nancy Eric     Added automatically from request for surgery [id number]           Cardiovascular risk factor    Left posterior fascicular block                  No past medical history on file.   Social History     Socioeconomic History    Marital status: /Civil Union Spouse name: Not on file    Number of children: Not on file    Years of education: Not on file    Highest education level: Not on file   Occupational History    Not on file   Tobacco Use    Smoking status: Never    Smokeless tobacco: Never   Substance and Sexual Activity    Alcohol use: No    Drug use: No    Sexual activity: Not on file   Other Topics Concern    Not on file   Social History Narrative    Not on file     Social Determinants of Health     Financial Resource Strain: Not on file   Food Insecurity: Not on file   Transportation Needs: Not on file   Physical Activity: Not on file   Stress: Not on file   Social Connections: Not on file   Intimate Partner Violence: Not on file   Housing Stability: Not on file      Family History   Problem Relation Age of Onset    Diabetes Family      Past Surgical History:   Procedure Laterality Date    [place] COLONOSCOPY FLX DX W/COLLJ [facility] INPATIENT [facility]HABILITATION WHEN PFRMD N/A 2/18/2019    Procedure: COLONOSCOPY;  Surgeon: Kaley Pires MD;  Location: [facility]; Service: Gastroenterology       Current Outpatient Medications:     hydrocortisone (ANUSOL-HC) 25 mg suppository, Insert 1 suppository (25 mg total) into the rectum 2 (two) times a day, Disp: 12 suppository, Rfl: 0  No Known Allergies    Labs:     Chemistry        Component Value Date/Time    K 4.5 04/17/2023 1055     04/17/2023 1055    CO2 27 04/17/2023 1055    BUN 17 04/17/2023 1055    CREATININE 1.09 04/17/2023 1055        Component Value Date/Time    CALCIUM 9.4 04/17/2023 1055    ALKPHOS 64 04/17/2023 1055    AST 21 04/17/2023 1055    ALT 20 04/17/2023 1055            No results found for: "CHOL"  No results found for: "HDL"  No results found for: "LDLCALC"  No results found for: "TRIG"  No results found for: "CHOLHDL"    Imaging: No results found. ECG:  Normal sinus rhythm left posterior fascicular block      Review of Systems   Constitutional: Negative. HENT: Negative. Eyes: Negative.     Cardiovascular: Negative. Respiratory: Negative. Endocrine: Negative. Hematologic/Lymphatic: Negative. Skin: Negative. Musculoskeletal: Negative. Gastrointestinal: Negative. Genitourinary: Negative. Neurological: Negative. Psychiatric/Behavioral: Negative. All other systems reviewed and are negative. Vitals:    12/04/23 0848   BP: 118/72   Pulse: 60   SpO2: 98%     Vitals:    12/04/23 0848   Weight: 99.3 kg (219 lb)     Height: 5' 11" (180.3 cm)   Body mass index is 30.54 kg/m². Physical Exam:  Vital signs reviewed  General:  Alert and cooperative, appears stated age, no acute distress  HEENT:  PERRLA, EOMI, no scleral icterus, no conjunctival pallor  Neck:  No lymphadenopathy, no thyromegaly, no carotid bruits, no elevated JVP  Heart:  Regular rate and rhythm, normal S1/S2, no S3/S4, no murmur, rubs or gallops. PMI nondisplaced  Lungs:  Clear to auscultation bilaterally, no wheezes rales or rhonchi  Abdomen:  Soft, non-tender, positive bowel sounds, no rebound or guarding,   no organomegaly   Extremities:  Normal range of motion.   No clubbing, cyanosis or edema   Vascular:  2+ pedal pulses  Skin:  No rashes or lesions on exposed skin  Neurologic:  Cranial nerves II-XII grossly intact without focal deficits  Psych:  Normal mood and affect

## 2023-12-07 ENCOUNTER — HOSPITAL ENCOUNTER (OUTPATIENT)
Dept: MRI IMAGING | Facility: CLINIC | Age: 53
Discharge: HOME | End: 2023-12-07
Payer: COMMERCIAL

## 2023-12-07 DIAGNOSIS — R42 DIZZINESS: ICD-10-CM

## 2023-12-07 PROCEDURE — G1004 CDSM NDSC: HCPCS

## 2023-12-07 PROCEDURE — 70551 MRI BRAIN STEM W/O DYE: CPT

## 2023-12-10 DIAGNOSIS — K62.5 RECTAL BLEEDING: ICD-10-CM

## 2023-12-11 RX ORDER — HYDROCORTISONE ACETATE 25 MG/1
25 SUPPOSITORY RECTAL 2 TIMES DAILY
Qty: 12 SUPPOSITORY | Refills: 0 | OUTPATIENT
Start: 2023-12-11

## 2023-12-11 NOTE — TELEPHONE ENCOUNTER
Left message for patient to return call and schedule appointment for medication refill, or contact PCP for refill

## 2023-12-13 ENCOUNTER — OFFICE VISIT (OUTPATIENT)
Age: 53
End: 2023-12-13
Payer: COMMERCIAL

## 2023-12-13 VITALS
OXYGEN SATURATION: 99 % | DIASTOLIC BLOOD PRESSURE: 80 MMHG | BODY MASS INDEX: 31.19 KG/M2 | HEART RATE: 73 BPM | RESPIRATION RATE: 16 BRPM | SYSTOLIC BLOOD PRESSURE: 120 MMHG | WEIGHT: 222.8 LBS | HEIGHT: 71 IN

## 2023-12-13 DIAGNOSIS — R73.01 IMPAIRED FASTING GLUCOSE: ICD-10-CM

## 2023-12-13 DIAGNOSIS — R53.83 OTHER FATIGUE: ICD-10-CM

## 2023-12-13 DIAGNOSIS — Z00.00 ANNUAL PHYSICAL EXAM: Primary | ICD-10-CM

## 2023-12-13 DIAGNOSIS — R42 DIZZINESS: ICD-10-CM

## 2023-12-13 DIAGNOSIS — K62.5 RECTAL BLEEDING: ICD-10-CM

## 2023-12-13 DIAGNOSIS — E78.2 MIXED HYPERLIPIDEMIA: ICD-10-CM

## 2023-12-13 DIAGNOSIS — Z11.59 NEED FOR HEPATITIS C SCREENING TEST: ICD-10-CM

## 2023-12-13 DIAGNOSIS — Z12.11 SCREENING FOR COLON CANCER: ICD-10-CM

## 2023-12-13 DIAGNOSIS — Z11.4 SCREENING FOR HIV (HUMAN IMMUNODEFICIENCY VIRUS): ICD-10-CM

## 2023-12-13 PROBLEM — E66.09 CLASS 1 OBESITY DUE TO EXCESS CALORIES WITHOUT SERIOUS COMORBIDITY WITH BODY MASS INDEX (BMI) OF 33.0 TO 33.9 IN ADULT: Status: ACTIVE | Noted: 2018-12-24

## 2023-12-13 PROBLEM — E66.811 CLASS 1 OBESITY DUE TO EXCESS CALORIES WITHOUT SERIOUS COMORBIDITY WITH BODY MASS INDEX (BMI) OF 33.0 TO 33.9 IN ADULT: Status: ACTIVE | Noted: 2018-12-24

## 2023-12-13 PROBLEM — R79.89 ABNORMAL THYROID BLOOD TEST: Status: ACTIVE | Noted: 2018-12-24

## 2023-12-13 PROCEDURE — 99396 PREV VISIT EST AGE 40-64: CPT

## 2023-12-13 RX ORDER — HYDROCORTISONE ACETATE 25 MG/1
25 SUPPOSITORY RECTAL 2 TIMES DAILY
Qty: 12 SUPPOSITORY | Refills: 0 | Status: SHIPPED | OUTPATIENT
Start: 2023-12-13 | End: 2023-12-21 | Stop reason: SDUPTHER

## 2023-12-13 NOTE — PROGRESS NOTES
INTERNAL MEDICINE FOLLOW-UP VISIT  [facility] - [facility] INTERNAL MEDICINE [address]    NAME: Alanis Palomares  AGE: 47 y.o. SEX: male  : 1970     DATE: 2023     Assessment and Plan:   1. Annual physical exam  He eats a well-balanced diet, but is going to weight management to discuss healthy diet. He drinks >60 oz of water a day. He walks his dog everyday 2-3x a day, discussed exercising 30 minutes 5x a week. He sleeps pretty well. He denies any alcohol, tobacco, or drug use. He is UTD with dentist and eye doctor. He works in IT at [employer].     2. Dizziness  It occurs once in a while, maybe 1-2x a month. It lasts a good minute. He has seen ENT, been to OT/PT, cardiology, and neurology and the workups were negative. 3. Mixed hyperlipidemia  Due for lipid panel. 4. Screening for colon cancer  He is scheduled for a colonoscopy in February. 5. Rectal bleeding  Has history of internal hemorrhoids. Needs refill of Anusol. BMI Counseling: Body mass index is 31.07 kg/m². The BMI is above normal. Nutrition recommendations include decreasing portion sizes, encouraging healthy choices of fruits and vegetables, decreasing fast food intake, consuming healthier snacks, limiting drinks that contain sugar, moderation in carbohydrate intake, increasing intake of lean protein, reducing intake of saturated and trans fat and reducing intake of cholesterol. Exercise recommendations include moderate physical activity 150 minutes/week and exercising 3-5 times per week. No pharmacotherapy was ordered. Patient referred to PCP. Rationale for BMI follow-up plan is due to patient being overweight or obese. Depression Screening and Follow-up Plan: Patient was screened for depression during today's encounter. They screened negative with a PHQ-2 score of 0. Return in about 6 months (around 2024).        Chief Complaint:     Chief Complaint   Patient presents with    Physical Exam History of Present Illness:   Patient is a 48 yo male that presents today to establish care. He has no new complaints today. Health Maintenance:  Declines flu shot at this time. Family history of aneurysms, hypothyroidism, heart disease. The following portions of the patient's history were reviewed and updated as appropriate: allergies, current medications, past family history, past medical history, past social history, past surgical history and problem list.     Review of Systems:     Review of Systems   Constitutional:  Negative for chills, fatigue and fever. HENT:  Negative for ear discharge, ear pain, postnasal drip, rhinorrhea, sinus pressure, sinus pain, sore throat, tinnitus and trouble swallowing. Eyes:  Negative for pain, discharge and itching. Respiratory:  Negative for cough, shortness of breath and wheezing. Cardiovascular:  Negative for chest pain, palpitations and leg swelling. Gastrointestinal:  Negative for abdominal pain, constipation, diarrhea, nausea and vomiting. Endocrine: Negative for polydipsia, polyphagia and polyuria. Genitourinary:  Negative for difficulty urinating, frequency, hematuria and urgency. Musculoskeletal:  Negative for arthralgias, joint swelling and myalgias. Skin:  Negative for color change and rash. Allergic/Immunologic: Negative for environmental allergies. Neurological:  Positive for dizziness. Negative for weakness, light-headedness, numbness and headaches. Hematological:  Negative for adenopathy. Psychiatric/Behavioral:  Negative for decreased concentration and sleep disturbance. The patient is not nervous/anxious. Past Medical History:   History reviewed. No pertinent past medical history.      Current Medications:     Current Outpatient Medications:     hydrocortisone (ANUSOL-HC) 25 mg suppository, Insert 1 suppository (25 mg total) into the rectum 2 (two) times a day, Disp: 12 suppository, Rfl: 0     Allergies:   No Known Allergies     Physical Exam:     /80 (BP Location: Left arm, Patient Position: Sitting, Cuff Size: Large)   Pulse 73   Resp 16   Ht 5' 11" (1.803 m)   Wt 101 kg (222 lb 12.8 oz)   SpO2 99%   BMI 31.07 kg/m²     Physical Exam  Vitals and nursing note reviewed. Constitutional:       General: He is awake. Appearance: Normal appearance. He is well-developed, well-groomed and overweight. HENT:      Head: Normocephalic and atraumatic. Right Ear: Hearing and external ear normal.      Left Ear: Hearing and external ear normal.      Nose: Nose normal.      Mouth/Throat:      Lips: Pink. Mouth: Mucous membranes are moist.   Eyes:      General: Lids are normal. Vision grossly intact. Gaze aligned appropriately. Conjunctiva/sclera: Conjunctivae normal.   Neck:      Vascular: No carotid bruit. Trachea: Trachea and phonation normal.   Cardiovascular:      Rate and Rhythm: Normal rate and regular rhythm. Heart sounds: Normal heart sounds, S1 normal and S2 normal. No murmur heard. No friction rub. No gallop. Pulmonary:      Effort: Pulmonary effort is normal.      Breath sounds: Normal breath sounds and air entry. No decreased breath sounds, wheezing, rhonchi or rales. Abdominal:      General: Abdomen is protuberant. Palpations: Abdomen is soft. Musculoskeletal:      Cervical back: Neck supple. Right lower leg: No edema. Left lower leg: No edema. Skin:     General: Skin is warm. Capillary Refill: Capillary refill takes less than 2 seconds. Neurological:      Mental Status: He is alert. Psychiatric:         Attention and Perception: Attention and perception normal.         Mood and Affect: Mood and affect normal.         Speech: Speech normal.         Behavior: Behavior normal. Behavior is cooperative. Thought Content:  Thought content normal.         Cognition and Memory: Cognition and memory normal.         Judgment: Judgment normal. Data:     Laboratory Results: I have personally reviewed the pertinent laboratory results/reports   Radiology/Other Diagnostic Testing Results: I have personally reviewed pertinent reports.       Lu Martines PA-C  [facility] INTERNAL MEDICINE [address] [facility] [address] fair balance

## 2023-12-15 ENCOUNTER — APPOINTMENT (OUTPATIENT)
Dept: LAB | Facility: CLINIC | Age: 53
End: 2023-12-15
Payer: COMMERCIAL

## 2023-12-15 DIAGNOSIS — E78.2 MIXED HYPERLIPIDEMIA: ICD-10-CM

## 2023-12-15 DIAGNOSIS — Z11.59 NEED FOR HEPATITIS C SCREENING TEST: ICD-10-CM

## 2023-12-15 DIAGNOSIS — Z11.4 SCREENING FOR HIV (HUMAN IMMUNODEFICIENCY VIRUS): ICD-10-CM

## 2023-12-15 DIAGNOSIS — R53.83 OTHER FATIGUE: ICD-10-CM

## 2023-12-15 DIAGNOSIS — R73.01 IMPAIRED FASTING GLUCOSE: ICD-10-CM

## 2023-12-15 LAB
CHOLEST SERPL-MCNC: 151 MG/DL
EST. AVERAGE GLUCOSE BLD GHB EST-MCNC: 123 MG/DL
HBA1C MFR BLD: 5.9 %
HCV AB SER QL: NORMAL
HDLC SERPL-MCNC: 52 MG/DL
HIV 1+2 AB+HIV1 P24 AG SERPL QL IA: NORMAL
HIV 2 AB SERPL QL IA: NORMAL
HIV1 AB SERPL QL IA: NORMAL
HIV1 P24 AG SERPL QL IA: NORMAL
LDLC SERPL CALC-MCNC: 68 MG/DL (ref 0–100)
NONHDLC SERPL-MCNC: 99 MG/DL
TRIGL SERPL-MCNC: 155 MG/DL
TSH SERPL DL<=0.05 MIU/L-ACNC: 3 UIU/ML (ref 0.45–4.5)

## 2023-12-15 PROCEDURE — 86803 HEPATITIS C AB TEST: CPT

## 2023-12-15 PROCEDURE — 80061 LIPID PANEL: CPT

## 2023-12-15 PROCEDURE — 84443 ASSAY THYROID STIM HORMONE: CPT

## 2023-12-15 PROCEDURE — 83036 HEMOGLOBIN GLYCOSYLATED A1C: CPT

## 2023-12-15 PROCEDURE — 87389 HIV-1 AG W/HIV-1&-2 AB AG IA: CPT

## 2023-12-15 PROCEDURE — 36415 COLL VENOUS BLD VENIPUNCTURE: CPT

## 2023-12-18 ENCOUNTER — TELEPHONE (OUTPATIENT)
Age: 53
End: 2023-12-18

## 2023-12-18 NOTE — TELEPHONE ENCOUNTER
----- Message from Berkley Martines PA-C sent at 12/18/2023  8:12 AM EST -----  Your A1c, or the measure of your sugars over the past 3 months, is slightly elevated in the prediabetic range.  Try to limit any excess carbs and sugars in your diet.  Your triglycerides are slightly elevated, this can vary over the course of 12 hour  s, we will continue to watch this.  Otherwise all of your labs look good.

## 2023-12-19 ENCOUNTER — OFFICE VISIT (OUTPATIENT)
Dept: BARIATRICS | Facility: CLINIC | Age: 53
End: 2023-12-19

## 2023-12-19 VITALS — BODY MASS INDEX: 31.39 KG/M2 | HEIGHT: 71 IN | WEIGHT: 224.2 LBS

## 2023-12-19 DIAGNOSIS — R63.5 ABNORMAL WEIGHT GAIN: Primary | ICD-10-CM

## 2023-12-19 PROCEDURE — RECHECK

## 2023-12-19 PROCEDURE — WMDI30

## 2023-12-19 NOTE — PROGRESS NOTES
Weight Management Medical Nutrition Assessment  Talha was here today for medical meal planning.  Today he weighs 224.2 and has a goal of 185#.  Has sedentary job and works from home.  Does not eat consistently and does skip meal at times.  Has a tendency to graze rather than have planned meals when he has a busy day.  Recommend that he start food logging.  Reviewed with him.  Reviewed his carb, calorie and protein needs.  Discussed meal planning and interval eating.  Recommend more consistent deliberate exercise.  Discussed meal replacements for the times he would normally skip lunch.  Provided sample meal plan, protein handout, and product samples.     Patient seen by Medical Provider in past 6 months:  yes  Requested to schedule appointment with Medical Provider: No      Anthropometric Measurements  Start Weight (#): 224.2  Current Weight (#): 224.2  TBW % Change from start weight:0%  Ideal Body Weight (#):172  Goal Weight (#):t lose 185  Highest:current wt  Lowest: 180     Weight Loss History  Previous weight loss attempts: self created    Food and Nutrition Related History  Wake up: 9 am   Bed Time:2 am    Food Recall  Breakfast:oatmeal or cerea    Snack:grazes on ham sandwich and what ever is there  Lunch:skips  Snack:grazes on pretzels and dip  Dinner:salmon or steak, salad and starch  Snack:grazes on fig rashid's and cookies      Beverages: water and coffee/tea  Volume of beverage intake: 48 oz    Weekends: Improved, not working and has time plan  Cravings: salty  Trouble area of day:midday    Frequency of Eating out: once a week  Food restrictions:none  Cooking: self   Food Shopping: self    Physical Activity Intake  Activity:Walking with dog (not far or strenuous)  Frequency:daily  Physical limitations/barriers to exercise: none    Estimated Needs  Energy    [employer] Needs: BMR : 1713   1-2# loss weekly sedentary: 1056 - 1556           1-2# loss weekly lightly active: 8957-8684     Maintenance  calories for sedentary activity level: 2056  Protein:94 - 117      (1.2-1.5g/kg IBW)  Fluid: 92     (35mL/kg IBW)    Nutrition Diagnosis  Yes;    Overweight/obesity  related to Excess energy intake as evidenced by  BMI more than normative standard for age and sex (obesity-grade I 30-34.9)       Nutrition Intervention    Nutrition Prescription  Calories:1200 - 1500  Protein:94 - 117  Fluid:92    Nutrition Education:    Calorie controlled menu  Lean protein food choices  Healthy snack options  Food journaling tips      Nutrition Counseling:  Strategies: meal planning, portion sizes, healthy snack choices, hydration, fiber intake, protein intake, exercise, food journal      Monitoring and Evaluation:  Evaluation criteria:  Energy Intake  Meet protein needs  Maintain adequate hydration  Monitor weekly weight  Meal planning/preparation  Food journal   Decreased portions at mealtimes and snacks  Physical activity     Barriers to learning:none  Readiness to change: Action:  (Changing behavior)  Comprehension: good  Expected Compliance: good

## 2023-12-21 ENCOUNTER — TELEPHONE (OUTPATIENT)
Dept: NEUROLOGY | Facility: CLINIC | Age: 53
End: 2023-12-21

## 2023-12-21 DIAGNOSIS — R42 DIZZINESS: Primary | ICD-10-CM

## 2023-12-21 DIAGNOSIS — K62.5 RECTAL BLEEDING: ICD-10-CM

## 2023-12-21 RX ORDER — HYDROCORTISONE ACETATE 25 MG/1
SUPPOSITORY RECTAL
Qty: 12 SUPPOSITORY | Refills: 0 | Status: SHIPPED | OUTPATIENT
Start: 2023-12-21

## 2023-12-21 NOTE — TELEPHONE ENCOUNTER
Patient called to get his MRI brain results and wants to know what the next step is. Please advise

## 2023-12-28 ENCOUNTER — OFFICE VISIT (OUTPATIENT)
Dept: AUDIOLOGY | Age: 53
End: 2023-12-28
Payer: COMMERCIAL

## 2023-12-28 ENCOUNTER — TELEPHONE (OUTPATIENT)
Dept: NEUROLOGY | Facility: CLINIC | Age: 53
End: 2023-12-28

## 2023-12-28 DIAGNOSIS — R42 DIZZINESS: Primary | ICD-10-CM

## 2023-12-28 PROCEDURE — 92537 CALORIC VSTBLR TEST W/REC: CPT | Performed by: AUDIOLOGIST

## 2023-12-28 PROCEDURE — 92567 TYMPANOMETRY: CPT | Performed by: AUDIOLOGIST

## 2023-12-28 PROCEDURE — 92540 BASIC VESTIBULAR EVALUATION: CPT | Performed by: AUDIOLOGIST

## 2023-12-28 NOTE — TELEPHONE ENCOUNTER
Patient was called and I discussed the results of the MRI. Patient states that his dizziness is improving and frequency has decreased as well.     I answered all the questions to the best of my ability.     Patient can follow with Neurology on a PRN basis. I told the patient that he is most welcome to call the office with questions or concerns.

## 2023-12-28 NOTE — PROGRESS NOTES
Videonystagmography (VNG) Evaluation    Name:  Talha Liriano  :  1970  Age:  53 y.o.  MRN:  [id number]  Date of Evaluation: 23     History: Dizziness    Reason for visit: Talha Liriano is seen today at the request of Dr. Dupree for an evaluation of balance. Today, Mr. Liriano reported that onset of symptoms began in 2022 shortly after he had COVID. These were described as intermittent in nature. Episodes were noted to have improved since initial onset leading up through his most recent episodes that occur one or two times per month. Dizziness perception was described as a(n) presyncopal sensation wherein Mr. Liriano sees a white flash and feels like he will pass out.  Typical duration of symptoms was noted to last seconds before subsiding. Episode frequency currently occurs on a once or twice monthly basis, but when it first began occurred several times per day for the first several months.  Mr. Liriano reports a low-level high-pitched bilateral tinnitus that he notices in quiet environments. The tinnitus began approximately the same time as his dizzy episodes. Mr. Liriano has not fallen previously/lost consciousness previously.      Tympanometry:   Right: Type A - normal middle ear pressure and compliance   Left: Type A - normal middle ear pressure and compliance    Oculomotor battery:    Gaze:          Right: Normal        Left: Normal         Up: Normal        Down: Normal      Tracking: Normal    Saccades: Normal     Optokinetic: Normal    Positioning/Positionals:     Nancy Barclay Buford:    Right: Negative      Left: Negative        Positionals:     Sitting: Normal    Supine: Normal    Head Right: 2 degrees leftbeating nystagmus, not clinically significant    Head Left: Normal    Body Right: Did not test    Body Left: Did not test      Calorics:     Bithermal Caloric Irrigation: Normal      VNG IMPRESSIONS:   Normal: No evidence of peripheral or central vestibular pathology indicated by  today's findings.        Recommendations:   1) Follow-up with referring provider to review today's results.  2) Continue to monitor dizziness symptoms. If symptoms worsen or fail to improve prior to follow-up with their referring provider, contact your primary care/or referring provider and/or urgent medical attention should be considered.          Marta Simms., CCC-A  Clinical Audiologist  [facility] AUDIOLOGY  [facility] [phone]

## 2023-12-28 NOTE — TELEPHONE ENCOUNTER
LEFT VOICEMAIL for Patient asking he contact the office. Please contact the Patient to discuss MRI results.

## 2024-01-05 ENCOUNTER — TELEPHONE (OUTPATIENT)
Age: 54
End: 2024-01-05

## 2024-01-05 DIAGNOSIS — H92.09 EAR PAIN, REFERRED, UNSPECIFIED LATERALITY: Primary | ICD-10-CM

## 2024-01-05 NOTE — TELEPHONE ENCOUNTER
Has appt 1/11 w/ audiology    Needs updated script to be seen for that appt    Vm that was left :    Good morning. My name is Merry. I'm calling from Audiology at [facility] in [place]. I'm calling because we have a mutual patient coming in on Thursday the 11th of January for a hearing test and I do need a script for that. The patient's name is Talha Liriano. Date of birth 1970. If you have any questions or would like to reach out, you can reach us at [phone]. Thank you. Landy.

## 2024-01-30 ENCOUNTER — OFFICE VISIT (OUTPATIENT)
Dept: AUDIOLOGY | Age: 54
End: 2024-01-30
Payer: COMMERCIAL

## 2024-01-30 DIAGNOSIS — H92.09 EAR PAIN, REFERRED, UNSPECIFIED LATERALITY: ICD-10-CM

## 2024-01-30 DIAGNOSIS — H90.3 SENSORY HEARING LOSS, BILATERAL: Primary | ICD-10-CM

## 2024-01-30 PROCEDURE — 92557 COMPREHENSIVE HEARING TEST: CPT | Performed by: AUDIOLOGIST

## 2024-01-30 PROCEDURE — 92567 TYMPANOMETRY: CPT | Performed by: AUDIOLOGIST

## 2024-01-30 NOTE — PROGRESS NOTES
HEARING EVALUATION    Name:  Talha Liriano  :  1970  Age:  53 y.o.   MRN:  [id number]  Date of Evaluation: 24     HISTORY:     Tinnitus    Reason for visit: Talha Liriano is being seen today at the request of Dr. Martines for an initial  evaluation of hearing.  Patient reports high-pitched bilateral tinnitus that began in 2022 after having COVID. Dizziness also began at that time for which patient was previously seen by this clinic for a VNG with normal results. The patient continues to experience infrequent episodes of dizziness.  He denies history of ear infections and family history of hearing loss.  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        EVALUATION:    Otoscopic Evaluation:   Right Ear: Unremarkable, canal clear   Left Ear: Unremarkable, canal clear    Tympanometry:   Right Ear: Type A; normal middle ear pressure and static compliance    Left Ear: Type A; normal middle ear pressure and static compliance     Speech Audiometry:  Speech Reception (SRT)   Right Ear: 15 dB HL   Left Ear: 20 dB HL    Word Recognition Scores (WRS):  Right Ear: excellent (100 % correct)     Left Ear: excellent (92 % correct)   Stimuli: [id number]    Pure Tone Audiometry:  Conventional pure tone audiometry from 250 - 8000 Hz  was obtained with good reliability and revealed the following:     Right Ear: Normal to mild sensorineural hearing loss     Left Ear: Normal to mild sensorineural hearing loss      *see attached audiogram    IMPRESSIONS:   Normal to mild sensorineural hearing loss.     RECOMMENDATIONS:  Annual hearing eval, Return to [facility]. for F/U, and Copy to Patient/Caregiver    PATIENT EDUCATION:   The results of today's results and recommendations were reviewed with the patient and his hearing thresholds were explained at length. Treatment options, including amplification and communication strategies, were discussed as  appropriate. The patient voiced understanding of his test results. Questions were addressed and the patient was encouraged to contact our department should concerns arise.    Patient was counseled about possible tinnitus triggers, such as caffeine, sodium, depression, stress, anxiety, and fatigue. Patient was also counseled about the potential causes of tinnitus and methods to adapt and/or cope with the tinnitus, such as the use of sound/noise generators. Other management options including the use of hearing aids and tinnitus re-training therapy were also discussed.  Patient appeared to understand and did not have further questions today.        Marta Simms., CCC-A  Clinical Audiologist  [facility] AUDIOLOGY  [address] [place] [address]  [place] [id number]

## 2024-02-18 ENCOUNTER — ANESTHESIA EVENT (OUTPATIENT)
Dept: GASTROENTEROLOGY | Facility: HOSPITAL | Age: 54
End: 2024-02-18

## 2024-02-19 ENCOUNTER — ANESTHESIA (OUTPATIENT)
Dept: GASTROENTEROLOGY | Facility: HOSPITAL | Age: 54
End: 2024-02-19

## 2024-02-19 ENCOUNTER — HOSPITAL ENCOUNTER (OUTPATIENT)
Dept: GASTROENTEROLOGY | Facility: HOSPITAL | Age: 54
Setting detail: OUTPATIENT SURGERY
Discharge: HOME | End: 2024-02-19
Attending: INTERNAL MEDICINE
Payer: COMMERCIAL

## 2024-02-19 VITALS
SYSTOLIC BLOOD PRESSURE: 106 MMHG | OXYGEN SATURATION: 95 % | TEMPERATURE: 97 F | HEART RATE: 52 BPM | HEIGHT: 71 IN | WEIGHT: 218.26 LBS | DIASTOLIC BLOOD PRESSURE: 71 MMHG | RESPIRATION RATE: 16 BRPM | BODY MASS INDEX: 30.56 KG/M2

## 2024-02-19 DIAGNOSIS — Z12.11 SCREENING FOR COLON CANCER: ICD-10-CM

## 2024-02-19 PROCEDURE — G0105 COLORECTAL SCRN; HI RISK IND: HCPCS | Performed by: INTERNAL MEDICINE

## 2024-02-19 RX ORDER — LIDOCAINE HYDROCHLORIDE 10 MG/ML
INJECTION, SOLUTION EPIDURAL; INFILTRATION; INTRACAUDAL; PERINEURAL AS NEEDED
Status: DISCONTINUED | OUTPATIENT
Start: 2024-02-19 | End: 2024-02-19

## 2024-02-19 RX ORDER — PROPOFOL 10 MG/ML
INJECTION, EMULSION INTRAVENOUS AS NEEDED
Status: DISCONTINUED | OUTPATIENT
Start: 2024-02-19 | End: 2024-02-19

## 2024-02-19 RX ORDER — SODIUM CHLORIDE, SODIUM LACTATE, POTASSIUM CHLORIDE, CALCIUM CHLORIDE 600; 310; 30; 20 MG/100ML; MG/100ML; MG/100ML; MG/100ML
INJECTION, SOLUTION INTRAVENOUS CONTINUOUS PRN
Status: DISCONTINUED | OUTPATIENT
Start: 2024-02-19 | End: 2024-02-19

## 2024-02-19 RX ADMIN — SODIUM CHLORIDE, SODIUM LACTATE, POTASSIUM CHLORIDE, AND CALCIUM CHLORIDE: .6; .31; .03; .02 INJECTION, SOLUTION INTRAVENOUS at 13:47

## 2024-02-19 RX ADMIN — LIDOCAINE HYDROCHLORIDE 50 MG: 10 INJECTION, SOLUTION EPIDURAL; INFILTRATION; INTRACAUDAL; PERINEURAL at 13:52

## 2024-02-19 RX ADMIN — PROPOFOL 30 MG: 10 INJECTION, EMULSION INTRAVENOUS at 13:56

## 2024-02-19 RX ADMIN — PROPOFOL 50 MG: 10 INJECTION, EMULSION INTRAVENOUS at 13:54

## 2024-02-19 RX ADMIN — PROPOFOL 20 MG: 10 INJECTION, EMULSION INTRAVENOUS at 13:58

## 2024-02-19 RX ADMIN — PROPOFOL 100 MG: 10 INJECTION, EMULSION INTRAVENOUS at 13:52

## 2024-02-19 NOTE — ANESTHESIA PREPROCEDURE EVALUATION
"Procedure:  COLONOSCOPY    Relevant Problems   CARDIO   (+) Left posterior fascicular block   (+) Mixed hyperlipidemia      GI/HEPATIC   (+) Hemorrhage of anus and rectum   (+) Rectal bleeding        Physical Exam    Airway    Mallampati score: II  TM Distance: >3 FB  Neck ROM: full     Dental       Cardiovascular      Pulmonary      Other Findings        Anesthesia Plan  ASA Score- 1     Anesthesia Type- IV sedation with anesthesia with ASA Monitors.         Additional Monitors:     Airway Plan:     Comment: Recent labs personally reviewed:  Lab Results       Component                Value               Date                       WBC                      7.45                12/15/2023                 HGB                      14.8                12/15/2023                 PLT                      147 (L)             12/15/2023            Lab Results       Component                Value               Date                       K                        4.2                 12/15/2023                 BUN                      15                  12/15/2023                 CREATININE               1.05                12/15/2023            No results found for: \"PTT\"   No results found for: \"INR\"    Blood type     I, Aria Xiong MD, have personally seen and evaluated the patient prior to anesthetic care.  I have reviewed the pre-anesthetic record, medical history, allergies, medications and any other medical records if appropriate to the anesthetic care.  If a CRNA is involved in the case, I have reviewed the CRNA assessment, if present, and agree. Patient consented for IV Sedation, general anesthesia as back up. Discussed risks of aspiration, IV infiltration, indications for conversion to general anesthesia. All questions and concerns addressed.     .       Plan Factors-Exercise tolerance (METS): >4 METS.    Chart reviewed.   Existing labs reviewed. Patient summary reviewed.    Patient is not a current smoker.  Patient " did not smoke on day of surgery.    Obstructive sleep apnea risk education given perioperatively.        Induction- intravenous.    Postoperative Plan-     Informed Consent- Anesthetic plan and risks discussed with patient.  I personally reviewed this patient with the CRNA. Discussed and agreed on the Anesthesia Plan with the CRNA..

## 2024-02-19 NOTE — H&P
"History and Physical -  [facility]  Talha Liriano 53 y.o. male MRN: [id number]                  HPI: Talha Liriano is a 53 y.o. year old male who presents for colonoscopy for history of colon polyp      REVIEW OF SYSTEMS: Per the HPI, and otherwise unremarkable.    Historical Information   History reviewed. No pertinent past medical history.  Past Surgical History:   Procedure Laterality Date    NH COLONOSCOPY FLX DX W/COLLJ SPEC WHEN PFRMD N/A 2/18/2019    Procedure: COLONOSCOPY;  Surgeon: Josh Wren III, MD;  Location: [facility];  Service: Gastroenterology     Social History   Social History     Substance and Sexual Activity   Alcohol Use No     Social History     Substance and Sexual Activity   Drug Use No     Social History     Tobacco Use   Smoking Status Never   Smokeless Tobacco Never     Family History   Problem Relation Age of Onset    Diabetes Family        Meds/Allergies     (Not in a hospital admission)      No Known Allergies    Objective     Blood pressure 150/90, pulse 57, temperature 98 °F (36.7 °C), temperature source Tympanic, resp. rate 16, height 5' 11\" (1.803 m), weight 99 kg (218 lb 4.1 oz), SpO2 99%.      PHYSICAL EXAM    /90   Pulse 57   Temp 98 °F (36.7 °C) (Tympanic)   Resp 16   Ht 5' 11\" (1.803 m)   Wt 99 kg (218 lb 4.1 oz)   SpO2 99%   BMI 30.44 kg/m²       Gen: NAD  CV: RRR  CHEST: Clear  ABD: soft, NT/ND  EXT: no edema      ASSESSMENT/PLAN:  This is a 53 y.o. year old male here for colonoscopy, and he is stable and optimized for his procedure.        "

## 2024-02-19 NOTE — ANESTHESIA POSTPROCEDURE EVALUATION
Post-Op Assessment Note    CV Status:  Stable  Pain Score: 0    Pain management: adequate       Mental Status:  Alert and awake   Hydration Status:  Euvolemic   PONV Controlled:  Controlled   Airway Patency:  Patent     Post Op Vitals Reviewed: Yes    No anethesia notable event occurred.    Staff: CRNA               BP   98/67   Temp   97.3   Pulse  70   Resp   14   SpO2   100

## 2024-02-21 PROBLEM — Z12.11 ENCOUNTER FOR SCREENING FOR MALIGNANT NEOPLASM OF COLON: Status: RESOLVED | Noted: 2019-02-04 | Resolved: 2024-02-21

## 2024-03-05 ENCOUNTER — CLINICAL SUPPORT (OUTPATIENT)
Dept: BARIATRICS | Facility: CLINIC | Age: 54
End: 2024-03-05

## 2024-03-05 DIAGNOSIS — R63.5 ABNORMAL WEIGHT GAIN: Primary | ICD-10-CM

## 2024-03-05 PROCEDURE — WMDI30

## 2024-03-05 PROCEDURE — RECHECK

## 2024-03-05 NOTE — PROGRESS NOTES
Weight Management Medical Nutrition Assessment  Talha was here today for meal planning.  Today he weighs 225.4 giving him a gain of 1.2 lbs.  He admits that he has had some stress relating to his job and possibly having to move or find a new job.  He is struggling with stress eating and admits that he is still skipping meals and not eating on a regular schedule. He has also been eating later at night and not exercising. Recommend that he not skip meals, start food logging and start walking daily.      Patient seen by Medical Provider in past 6 months:  yes  Requested to schedule appointment with Medical Provider: No        Anthropometric Measurements  Start Weight (#): 224.2  Current Weight (#): 225.4  TBW % Change from start weight:0%  Ideal Body Weight (#):172  Goal Weight (#):t lose 185  Highest:current wt  Lowest: 180      Weight Loss History  Previous weight loss attempts: self created     Food and Nutrition Related History  Wake up: 9 am              Bed Time:2 am     Food Recall  Breakfast:eggs                Snack:  Lunch:skips  Snack:grazes on pretzels and dip  Dinner:salmon or steak, salad and starch  Snack:grazes on fig rashid's and cookies        Beverages: water and coffee/tea  Volume of beverage intake: 48 oz     Weekends: Improved, not working and has time plan  Cravings: salty  Trouble area of day:midday     Frequency of Eating out: once a week  Food restrictions:none  Cooking: self   Food Shopping: self     Physical Activity Intake  Activity:Walking with dog (not far or strenuous)  Frequency:daily  Physical limitations/barriers to exercise: none     Estimated Needs  Energy     [employer] Needs: BMR : 1713   1-2# loss weekly sedentary: 1056 - 1556           1-2# loss weekly lightly active: 5189-1973     Maintenance calories for sedentary activity level: 2056  Protein:94 - 117      (1.2-1.5g/kg IBW)  Fluid: 92     (35mL/kg IBW)     Nutrition Diagnosis  Yes;    Overweight/obesity  related  to Excess energy intake as evidenced by  BMI more than normative standard for age and sex (obesity-grade I 30-34.9)     Nutrition Intervention     Nutrition Prescription  Calories:1200 - 1500  Protein:94 - 117  Fluid:92     Nutrition Education:    Calorie controlled menu  Lean protein food choices  Healthy snack options  Food journaling tips        Nutrition Counseling:  Strategies: meal planning, portion sizes, healthy snack choices, hydration, fiber intake, protein intake, exercise, food journal        Monitoring and Evaluation:  Evaluation criteria:  Energy Intake  Meet protein needs  Maintain adequate hydration  Monitor weekly weight  Meal planning/preparation  Food journal   Decreased portions at mealtimes and snacks  Physical activity      Barriers to learning:none  Readiness to change: Action:  (Changing behavior)  Comprehension: good  Expected Compliance: good

## 2024-03-06 VITALS — WEIGHT: 225.4 LBS | HEIGHT: 71 IN | BODY MASS INDEX: 31.56 KG/M2

## 2024-06-17 ENCOUNTER — TELEPHONE (OUTPATIENT)
Dept: NEUROLOGY | Facility: CLINIC | Age: 54
End: 2024-06-17

## 2024-08-01 ENCOUNTER — OFFICE VISIT (OUTPATIENT)
Age: 54
End: 2024-08-01
Payer: COMMERCIAL

## 2024-08-01 VITALS
OXYGEN SATURATION: 98 % | SYSTOLIC BLOOD PRESSURE: 124 MMHG | BODY MASS INDEX: 30.02 KG/M2 | HEART RATE: 58 BPM | WEIGHT: 214.4 LBS | HEIGHT: 71 IN | DIASTOLIC BLOOD PRESSURE: 80 MMHG

## 2024-08-01 DIAGNOSIS — E66.3 OVERWEIGHT (BMI 25.0-29.9): ICD-10-CM

## 2024-08-01 DIAGNOSIS — R06.83 SNORING: Primary | ICD-10-CM

## 2024-08-01 DIAGNOSIS — G47.19 EXCESSIVE DAYTIME SLEEPINESS: ICD-10-CM

## 2024-08-01 PROCEDURE — 99203 OFFICE O/P NEW LOW 30 MIN: CPT | Performed by: INTERNAL MEDICINE

## 2024-08-01 NOTE — PROGRESS NOTES
Sleep Consultation   Talha Liriano 53 y.o. male MRN: [id number]      Reason for consultation: Snoring    Requesting physician: Lester Dupree MD    Assessment/Plan    Suspected sleep apnea  Mallampati class 4, Body mass index is 29.9 kg/m²., Neck Circumference: 18.5.    He/she is at risk for obstructive sleep apnea based on STOP BANG survey based on snoring, tiredness, age, male gender, increased neck circumference  S/s: Snoring, tiredness, daytime sleepiness unrefreshing sleep  Oglethorpe score: 6, despite this she reports feeling tired and sleepy throughout the day  I discussed in depth the diagnostic studies and treatment options involved with obstructive sleep apnea  I also discussed in depth the risk of leaving sleep apnea untreated including hypertension, heart failure, arrhythmia, MI and stroke.  The patient is agreeable to undergo testing and treatment of obstructive sleep apnea.  He/she understands the pitfalls he/she may encounter along the way and is willing to attempt CPAP treatment.     Plan  Ordered home sleep study  Patient is amenable to CPAP    2.  Snoring  Reports loud snoring    3.  Excessive daytime sleepiness  Oglethorpe score of 6, , despite this she reports feeling tired and sleepy throughout the day    4.  Overweight  Counseled patient on lifestyle modifications including diet and exercise  I explained that weight loss will generally decrease severity of sleep apnea    History of Present Illness   HPI:  Talha Liriano is a 53 y.o. male with PMHx hyperlipidemia who presents for evaluation of suspected sleep apnea.  He reports loud snoring.  He is tired and sleepy throughout the day.  He reports an Oglethorpe score of 6.  He goes to bed at 1 AM and falls asleep quickly.  He wakes up at 8 AM.  He does not feel refreshed when he wakes up.  He wakes up twice a night to urinate.  He feels sleepy especially after lunch.  He has been told that he snores loudly.  He has lost about 6 pounds recently and has  been seeing weight management.  He drinks 3 cups of coffee daily.  He occasionally drinks alcohol.            Review of Systems      Genitourinary none   Cardiology none   Gastrointestinal none   Neurology none   Constitutional none   Integumentary none   Psychiatry none   Musculoskeletal none   Pulmonary none   ENT none   Endocrine none   Hematological none         Historical Information   History reviewed. No pertinent past medical history.  Past Surgical History:   Procedure Laterality Date    DE COLONOSCOPY FLX DX W/COLLJ SPEC WHEN PFRMD N/A 2/18/2019    Procedure: COLONOSCOPY;  Surgeon: Josh Wren III, MD;  Location: [facility];  Service: Gastroenterology     Family History   Problem Relation Age of Onset    Diabetes Family      Social History     Socioeconomic History    Marital status: /Civil Union     Spouse name: Not on file    Number of children: Not on file    Years of education: Not on file    Highest education level: Not on file   Occupational History    Not on file   Tobacco Use    Smoking status: Never    Smokeless tobacco: Never   Substance and Sexual Activity    Alcohol use: No    Drug use: No    Sexual activity: Yes     Partners: Female   Other Topics Concern    Not on file   Social History Narrative    Not on file     Social Determinants of Health     Financial Resource Strain: Not on file   Food Insecurity: Not on file   Transportation Needs: Not on file   Physical Activity: Not on file   Stress: Not on file   Social Connections: Unknown (6/18/2024)    Received from [employer]     How often do you feel lonely or isolated from those around you? (Adult - for ages 18 years and over): Not on file   Intimate Partner Violence: Not on file   Housing Stability: Not on file       Occupational History: IT    Meds/Allergies   No Known Allergies    Home medications:  Prior to Admission medications    Medication Sig Start Date End Date Taking? Authorizing Provider  "  hydrocortisone (ANUSOL-HC) 25 mg suppository Insert 1 suppository into the rectum twice a day 12/21/23  Yes Berkley Martines PA-C       Vitals:   Blood pressure 124/80, pulse 58, height 5' 11\" (1.803 m), weight 97.3 kg (214 lb 6.4 oz), SpO2 98%.,  Body mass index is 29.9 kg/m².  Neck Circumference: 18.5    Physical Exam  General: Awake alert and oriented x 3, conversant without conversational dyspnea, NAD, normal affect  HEENT:  PERRL, Sclera noninjected, nonicteric OU, Nares patent,  no craniofacial abnormalities, Mucous membranes, moist, no oral lesions, normal dentition, Mallampati class 4  NECK:  Trachea midline, no accessory muscle use, no stridor, no cervical or supraclavicular adenopathy, JVP not elevated  CARDIAC: Reg, single s1/S2, no m/r/g  PULM: CTA bilaterally no wheezing, rhonchi or rales  EXT: No cyanosis, no clubbing, no edema, normal capillary refill  NEURO: no focal neurologic deficits, AAOx3, moving all extremities appropriately    Labs: I have personally reviewed pertinent lab results.  Lab Results   Component Value Date    WBC 7.45 12/15/2023    HGB 14.8 12/15/2023    HCT 45.7 12/15/2023    MCV 88 12/15/2023     (L) 12/15/2023      Lab Results   Component Value Date    CALCIUM 9.7 12/15/2023    K 4.2 12/15/2023    CO2 29 12/15/2023     12/15/2023    BUN 15 12/15/2023    CREATININE 1.05 12/15/2023     No results found for: \"IRON\", \"TIBC\", \"FERRITIN\"  No results found for: \"OQZTJFSP05\"  No results found for: \"FOLATE\"      Arterial Blood Gas result:  BERE Husain MD  [facility]   "

## 2024-09-12 ENCOUNTER — HOSPITAL ENCOUNTER (OUTPATIENT)
Dept: CT IMAGING | Facility: HOSPITAL | Age: 54
End: 2024-09-12
Payer: COMMERCIAL

## 2024-09-12 ENCOUNTER — OFFICE VISIT (OUTPATIENT)
Age: 54
End: 2024-09-12
Payer: COMMERCIAL

## 2024-09-12 VITALS
HEART RATE: 86 BPM | OXYGEN SATURATION: 97 % | RESPIRATION RATE: 18 BRPM | DIASTOLIC BLOOD PRESSURE: 70 MMHG | TEMPERATURE: 98.7 F | WEIGHT: 218 LBS | SYSTOLIC BLOOD PRESSURE: 104 MMHG | BODY MASS INDEX: 30.52 KG/M2 | HEIGHT: 71 IN

## 2024-09-12 DIAGNOSIS — R51.9 ACUTE INTRACTABLE HEADACHE, UNSPECIFIED HEADACHE TYPE: Primary | ICD-10-CM

## 2024-09-12 DIAGNOSIS — R51.9 ACUTE INTRACTABLE HEADACHE, UNSPECIFIED HEADACHE TYPE: ICD-10-CM

## 2024-09-12 PROCEDURE — 99214 OFFICE O/P EST MOD 30 MIN: CPT

## 2024-09-12 PROCEDURE — 70450 CT HEAD/BRAIN W/O DYE: CPT

## 2024-09-12 NOTE — PROGRESS NOTES
INTERNAL MEDICINE FOLLOW-UP VISIT  [facility] - [facility] INTERNAL MEDICINE [address]    NAME: Talha Liriano  AGE: 53 y.o. SEX: male  : 1970     DATE: 2024     Assessment and Plan:   1. Acute intractable headache, unspecified headache type  Due to sudden onset, no history of headache, age greater than 50, and headache persisting, will obtain stat CT head without contrast.  If CT is unremarkable, will treat as ocular migraine.  Discussed treatment options which include alternating Tylenol and ibuprofen or Excedrin.  Also discussed sumatriptan for abortive medication and potential adverse effects.  Continue to drink an adequate amount of water.  Will follow-up with CT results.  - CT head wo contrast; Future        No follow-ups on file.       Chief Complaint:     Chief Complaint   Patient presents with    Headache     Since , left side    Dizziness    Nausea      History of Present Illness:   Patient is a 52 yo male that presents today for a headache, dizziness, and nausea that started on .  He notes he woke up on  and throughout the day he started to experience a left-sided headache.  He describes as pressure behind his eye.  It is a throbbing pain that varies in intensity throughout the day.  He does admit to some left eye blurriness, photophobia, nausea, and worsening pain when laying on that side.  He had some slight left-sided dental pain on , but has resolved.  He denies a history of migraines.  He denies any recent falls.  He denies any confusion, and his wife agrees he is at his baseline.  He denies any other upper respiratory symptoms.  He took Tylenol and Advil with no relief.  He does admit to an increase in stress lately.    The following portions of the patient's history were reviewed and updated as appropriate: allergies, current medications, past family history, past medical history, past social history, past surgical history and problem  "list.     Review of Systems:     Review of Systems   Constitutional:  Negative for chills and fever.   HENT:  Positive for dental problem (Sunday). Negative for ear discharge, ear pain, rhinorrhea, sinus pressure, sinus pain, sore throat and trouble swallowing.    Eyes:  Positive for photophobia. Negative for visual disturbance.   Respiratory:  Negative for cough, shortness of breath and wheezing.    Cardiovascular:  Negative for chest pain.   Gastrointestinal:  Negative for abdominal pain, constipation, diarrhea, nausea and vomiting.   Genitourinary:  Negative for difficulty urinating and dysuria.   Musculoskeletal:  Negative for arthralgias and myalgias.   Skin:  Negative for rash.   Neurological:  Positive for dizziness, light-headedness and headaches.        Past Medical History:   History reviewed. No pertinent past medical history.     Current Medications:     Current Outpatient Medications:     hydrocortisone (ANUSOL-HC) 25 mg suppository, Insert 1 suppository into the rectum twice a day (Patient not taking: Reported on 9/12/2024), Disp: 12 suppository, Rfl: 0     Allergies:   No Known Allergies     Physical Exam:     /70 (BP Location: Left arm, Patient Position: Sitting, Cuff Size: Standard)   Pulse 86   Temp 98.7 °F (37.1 °C) (Temporal)   Resp 18   Ht 5' 11\" (1.803 m)   Wt 98.9 kg (218 lb)   SpO2 97%   BMI 30.40 kg/m²     Physical Exam  Vitals and nursing note reviewed.   Constitutional:       General: He is awake.      Appearance: Normal appearance. He is well-developed, well-groomed and overweight.   HENT:      Head: Normocephalic and atraumatic.      Right Ear: Hearing and external ear normal.      Left Ear: Hearing and external ear normal.      Nose: Nose normal.      Mouth/Throat:      Lips: Pink.      Mouth: Mucous membranes are moist.   Eyes:      General: Lids are normal. Vision grossly intact. Gaze aligned appropriately.      Extraocular Movements: Extraocular movements intact.      " Conjunctiva/sclera: Conjunctivae normal.      Pupils: Pupils are equal, round, and reactive to light.   Neck:      Vascular: No carotid bruit.      Trachea: Trachea and phonation normal.   Cardiovascular:      Rate and Rhythm: Normal rate and regular rhythm.      Heart sounds: Normal heart sounds, S1 normal and S2 normal. No murmur heard.     No friction rub. No gallop.   Pulmonary:      Effort: Pulmonary effort is normal.      Breath sounds: Normal breath sounds and air entry. No decreased breath sounds, wheezing, rhonchi or rales.   Abdominal:      General: Abdomen is protuberant.   Musculoskeletal:      Cervical back: Neck supple.      Right lower leg: No edema.      Left lower leg: No edema.   Skin:     General: Skin is warm.      Capillary Refill: Capillary refill takes less than 2 seconds.   Neurological:      Mental Status: He is alert.   Psychiatric:         Attention and Perception: Attention and perception normal.         Mood and Affect: Mood and affect normal.         Speech: Speech normal.         Behavior: Behavior normal. Behavior is cooperative.         Thought Content: Thought content normal.         Cognition and Memory: Cognition and memory normal.         Judgment: Judgment normal.           Data:     Laboratory Results: I have personally reviewed the pertinent laboratory results/reports   Radiology/Other Diagnostic Testing Results: No pertinent imaging studies reviewed.    Berkley Martines PA-C  [facility] INTERNAL MEDICINE [address]

## 2024-09-13 ENCOUNTER — TELEPHONE (OUTPATIENT)
Age: 54
End: 2024-09-13

## 2024-09-13 NOTE — TELEPHONE ENCOUNTER
----- Message from Berkley Martines PA-C sent at 9/13/2024  7:33 AM EDT -----  CT head without contrast is negative for any abnormalities.  We will continue to treat this as a migraine with either Excedrin, or alternating Tylenol 1000 mg and ibuprofen 600 mg every 3 hours.  Increase fluid intake.  I can also send over that medication, called sumatriptan, to try to see if  eases the headache. Let me know if you want it.

## 2024-09-18 ENCOUNTER — HOSPITAL ENCOUNTER (OUTPATIENT)
Facility: CLINIC | Age: 54
Discharge: HOME | End: 2024-09-18
Payer: COMMERCIAL

## 2024-09-18 DIAGNOSIS — G47.19 EXCESSIVE DAYTIME SLEEPINESS: ICD-10-CM

## 2024-09-18 PROCEDURE — G0399 HOME SLEEP TEST/TYPE 3 PORTA: HCPCS

## 2024-09-19 DIAGNOSIS — Z13.0 SCREENING FOR DEFICIENCY ANEMIA: Primary | ICD-10-CM

## 2024-09-19 DIAGNOSIS — R53.83 OTHER FATIGUE: ICD-10-CM

## 2024-09-19 DIAGNOSIS — Z13.220 SCREENING FOR LIPID DISORDERS: ICD-10-CM

## 2024-09-19 DIAGNOSIS — R73.01 IMPAIRED FASTING GLUCOSE: ICD-10-CM

## 2024-09-20 ENCOUNTER — APPOINTMENT (OUTPATIENT)
Dept: LAB | Facility: CLINIC | Age: 54
End: 2024-09-20
Payer: COMMERCIAL

## 2024-09-20 DIAGNOSIS — R53.83 OTHER FATIGUE: ICD-10-CM

## 2024-09-20 DIAGNOSIS — Z13.220 SCREENING FOR LIPID DISORDERS: ICD-10-CM

## 2024-09-20 DIAGNOSIS — Z13.0 SCREENING FOR DEFICIENCY ANEMIA: ICD-10-CM

## 2024-09-20 DIAGNOSIS — R73.01 IMPAIRED FASTING GLUCOSE: ICD-10-CM

## 2024-09-20 LAB
ALBUMIN SERPL BCG-MCNC: 4.3 G/DL (ref 3.5–5)
ALP SERPL-CCNC: 63 U/L (ref 34–104)
ALT SERPL W P-5'-P-CCNC: 22 U/L (ref 7–52)
ANION GAP SERPL CALCULATED.3IONS-SCNC: 9 MMOL/L (ref 4–13)
AST SERPL W P-5'-P-CCNC: 19 U/L (ref 13–39)
BASOPHILS # BLD AUTO: 0.05 THOUSANDS/ΜL (ref 0–0.1)
BASOPHILS NFR BLD AUTO: 1 % (ref 0–1)
BILIRUB SERPL-MCNC: 0.55 MG/DL (ref 0.2–1)
BUN SERPL-MCNC: 14 MG/DL (ref 5–25)
CALCIUM SERPL-MCNC: 9.2 MG/DL (ref 8.4–10.2)
CHLORIDE SERPL-SCNC: 104 MMOL/L (ref 96–108)
CHOLEST SERPL-MCNC: 150 MG/DL
CO2 SERPL-SCNC: 25 MMOL/L (ref 21–32)
CREAT SERPL-MCNC: 1.03 MG/DL (ref 0.6–1.3)
EOSINOPHIL # BLD AUTO: 0.24 THOUSAND/ΜL (ref 0–0.61)
EOSINOPHIL NFR BLD AUTO: 3 % (ref 0–6)
ERYTHROCYTE [DISTWIDTH] IN BLOOD BY AUTOMATED COUNT: 13 % (ref 11.6–15.1)
GFR SERPL CREATININE-BSD FRML MDRD: 81 ML/MIN/1.73SQ M
GLUCOSE P FAST SERPL-MCNC: 91 MG/DL (ref 65–99)
HCT VFR BLD AUTO: 43.6 % (ref 36.5–49.3)
HDLC SERPL-MCNC: 50 MG/DL
HGB BLD-MCNC: 14.3 G/DL (ref 12–17)
IMM GRANULOCYTES # BLD AUTO: 0.05 THOUSAND/UL (ref 0–0.2)
IMM GRANULOCYTES NFR BLD AUTO: 1 % (ref 0–2)
LDLC SERPL CALC-MCNC: 74 MG/DL (ref 0–100)
LYMPHOCYTES # BLD AUTO: 2.67 THOUSANDS/ΜL (ref 0.6–4.47)
LYMPHOCYTES NFR BLD AUTO: 33 % (ref 14–44)
MCH RBC QN AUTO: 28.7 PG (ref 26.8–34.3)
MCHC RBC AUTO-ENTMCNC: 32.8 G/DL (ref 31.4–37.4)
MCV RBC AUTO: 87 FL (ref 82–98)
MONOCYTES # BLD AUTO: 0.72 THOUSAND/ΜL (ref 0.17–1.22)
MONOCYTES NFR BLD AUTO: 9 % (ref 4–12)
NEUTROPHILS # BLD AUTO: 4.3 THOUSANDS/ΜL (ref 1.85–7.62)
NEUTS SEG NFR BLD AUTO: 53 % (ref 43–75)
NONHDLC SERPL-MCNC: 100 MG/DL
NRBC BLD AUTO-RTO: 0 /100 WBCS
PLATELET # BLD AUTO: 148 THOUSANDS/UL (ref 149–390)
PMV BLD AUTO: 13 FL (ref 8.9–12.7)
POTASSIUM SERPL-SCNC: 4.1 MMOL/L (ref 3.5–5.3)
PROT SERPL-MCNC: 7 G/DL (ref 6.4–8.4)
RBC # BLD AUTO: 4.99 MILLION/UL (ref 3.88–5.62)
SODIUM SERPL-SCNC: 138 MMOL/L (ref 135–147)
TRIGL SERPL-MCNC: 131 MG/DL
TSH SERPL DL<=0.05 MIU/L-ACNC: 4.88 UIU/ML (ref 0.45–4.5)
WBC # BLD AUTO: 8.03 THOUSAND/UL (ref 4.31–10.16)

## 2024-09-20 PROCEDURE — 84443 ASSAY THYROID STIM HORMONE: CPT

## 2024-09-20 PROCEDURE — 85025 COMPLETE CBC W/AUTO DIFF WBC: CPT

## 2024-09-20 PROCEDURE — 36415 COLL VENOUS BLD VENIPUNCTURE: CPT

## 2024-09-20 PROCEDURE — 83036 HEMOGLOBIN GLYCOSYLATED A1C: CPT

## 2024-09-20 PROCEDURE — 84439 ASSAY OF FREE THYROXINE: CPT

## 2024-09-20 PROCEDURE — 80053 COMPREHEN METABOLIC PANEL: CPT

## 2024-09-20 PROCEDURE — 80061 LIPID PANEL: CPT

## 2024-09-21 LAB
EST. AVERAGE GLUCOSE BLD GHB EST-MCNC: 123 MG/DL
HBA1C MFR BLD: 5.9 %
T4 FREE SERPL-MCNC: 0.82 NG/DL (ref 0.61–1.12)

## 2024-09-23 ENCOUNTER — TELEPHONE (OUTPATIENT)
Age: 54
End: 2024-09-23

## 2024-09-23 PROBLEM — G47.33 OSA (OBSTRUCTIVE SLEEP APNEA): Status: ACTIVE | Noted: 2024-09-23

## 2024-09-23 NOTE — TELEPHONE ENCOUNTER
----- Message from Berkley Martines PA-C sent at 9/23/2024  7:31 AM EDT -----  Overall labs look stable, they will be reviewed at your appointment in October.

## 2024-09-23 NOTE — PROGRESS NOTES
Home Sleep Study Documentation    HOME STUDY DEVICE: Noxturnal yes                                           Cynthia G3 no      Pre-Sleep Home Study:    Set-up and instructions performed by: T Tech    Technician performed demonstration for Patient: yes    Return demonstration performed by Patient: yes    Written instructions provided to Patient: yes    Patient signed consent form: yes        Post-Sleep Home Study:    Additional comments by Patient: None    Home Sleep Study Failed:no:    Failure reason: N/A    Reported or Detected: N/A    Scored by: ANGELICA Catherine

## 2024-09-26 DIAGNOSIS — G47.33 OSA (OBSTRUCTIVE SLEEP APNEA): Primary | ICD-10-CM

## 2024-09-26 PROCEDURE — 95806 SLEEP STUDY UNATT&RESP EFFT: CPT | Performed by: INTERNAL MEDICINE

## 2024-09-26 NOTE — PROGRESS NOTES
Ordered auto CPAP with pressure range 5-15 to expedite treatment.  He has mild ERIKA and is symptomatic with excessive daytime sleepiness.  Home sleep study did show evidence of central apneas.  Ordered CPAP study to ensure elimination of central apneas.

## 2024-10-02 ENCOUNTER — OFFICE VISIT (OUTPATIENT)
Age: 54
End: 2024-10-02
Payer: COMMERCIAL

## 2024-10-02 VITALS
TEMPERATURE: 98.9 F | BODY MASS INDEX: 31.5 KG/M2 | HEART RATE: 75 BPM | HEIGHT: 71 IN | SYSTOLIC BLOOD PRESSURE: 126 MMHG | WEIGHT: 225 LBS | DIASTOLIC BLOOD PRESSURE: 86 MMHG | OXYGEN SATURATION: 96 %

## 2024-10-02 DIAGNOSIS — R73.03 PREDIABETES: ICD-10-CM

## 2024-10-02 DIAGNOSIS — R42 DIZZINESS: ICD-10-CM

## 2024-10-02 DIAGNOSIS — G47.33 OSA (OBSTRUCTIVE SLEEP APNEA): ICD-10-CM

## 2024-10-02 DIAGNOSIS — Z00.00 WELL ADULT EXAM: Primary | ICD-10-CM

## 2024-10-02 DIAGNOSIS — G44.221 CHRONIC TENSION-TYPE HEADACHE, INTRACTABLE: ICD-10-CM

## 2024-10-02 DIAGNOSIS — E03.8 SUBCLINICAL HYPOTHYROIDISM: ICD-10-CM

## 2024-10-02 DIAGNOSIS — E78.2 MIXED HYPERLIPIDEMIA: ICD-10-CM

## 2024-10-02 PROCEDURE — 99396 PREV VISIT EST AGE 40-64: CPT | Performed by: INTERNAL MEDICINE

## 2024-10-02 NOTE — PATIENT INSTRUCTIONS
Lab data reviewed in detail and compared to prior    Prediabetes-A1c stable 5.9    Subclinical hypothyroidism-no indication for treatment, will follow-up labs in 6 months    Obstructive sleep apnea-schedule in-house study    Headache syndrome is of unclear etiology, workup thus far has been negative.  I recommend regular aerobic exercise with goal 30 minutes/day, 5 days/week.  This may be stress related.  Monitor symptoms.  Contact me if symptoms worsen or fail to improve    Health maintenance-up-to-date, declines flu shot today

## 2024-10-02 NOTE — PROGRESS NOTES
Assessment/Plan:    There are no diagnoses linked to this encounter.          There are no Patient Instructions on file for this visit.    Subjective:      Patient ID: Talha Liriano is a 53 y.o. male    F/u MMP and review labs  Feeling well   to [place], no kids  Works for [employer]  Walks dogs daily  Still w/ burning sensation L frontal region. 4/10.  No visual disturbance.  No injury.  No photo/phonophobia.  No neck pain.  CT was negative.  No improvement w/ allergy rx.    Notes some stress w/ work, may need to move to [place].    W/u for bertha w/ NICHOLE 13, scheduling inpatient study.  H/o vertigo and presyncope last fall w/ neg MRI, amb monitor, sx resolved.       No current outpatient medications on file.    Recent Results (from the past 1008 hour(s))   CBC and differential    Collection Time: 09/20/24  7:50 AM   Result Value Ref Range    WBC 8.03 4.31 - 10.16 Thousand/uL    RBC 4.99 3.88 - 5.62 Million/uL    Hemoglobin 14.3 12.0 - 17.0 g/dL    Hematocrit 43.6 36.5 - 49.3 %    MCV 87 82 - 98 fL    MCH 28.7 26.8 - 34.3 pg    MCHC 32.8 31.4 - 37.4 g/dL    RDW 13.0 11.6 - 15.1 %    MPV 13.0 (H) 8.9 - 12.7 fL    Platelets 148 (L) 149 - 390 Thousands/uL    nRBC 0 /100 WBCs    Segmented % 53 43 - 75 %    Immature Grans % 1 0 - 2 %    Lymphocytes % 33 14 - 44 %    Monocytes % 9 4 - 12 %    Eosinophils Relative 3 0 - 6 %    Basophils Relative 1 0 - 1 %    Absolute Neutrophils 4.30 1.85 - 7.62 Thousands/µL    Absolute Immature Grans 0.05 0.00 - 0.20 Thousand/uL    Absolute Lymphocytes 2.67 0.60 - 4.47 Thousands/µL    Absolute Monocytes 0.72 0.17 - 1.22 Thousand/µL    Eosinophils Absolute 0.24 0.00 - 0.61 Thousand/µL    Basophils Absolute 0.05 0.00 - 0.10 Thousands/µL   Comprehensive metabolic panel    Collection Time: 09/20/24  7:50 AM   Result Value Ref Range    Sodium 138 135 - 147 mmol/L    Potassium 4.1 3.5 - 5.3 mmol/L    Chloride 104 96 - 108 mmol/L    CO2 25 21 - 32 mmol/L    ANION GAP  9 4 - 13 mmol/L    BUN 14 5 - 25 mg/dL    Creatinine 1.03 0.60 - 1.30 mg/dL    Glucose, Fasting 91 65 - 99 mg/dL    Calcium 9.2 8.4 - 10.2 mg/dL    AST 19 13 - 39 U/L    ALT 22 7 - 52 U/L    Alkaline Phosphatase 63 34 - 104 U/L    Total Protein 7.0 6.4 - 8.4 g/dL    Albumin 4.3 3.5 - 5.0 g/dL    Total Bilirubin 0.55 0.20 - 1.00 mg/dL    eGFR 81 ml/min/1.73sq m   Hemoglobin A1C    Collection Time: 09/20/24  7:50 AM   Result Value Ref Range    Hemoglobin A1C 5.9 (H) Normal 4.0-5.6%; PreDiabetic 5.7-6.4%; Diabetic >=6.5%; Glycemic control for adults with diabetes <7.0% %     mg/dl   Lipid panel    Collection Time: 09/20/24  7:50 AM   Result Value Ref Range    Cholesterol 150 See Comment mg/dL    Triglycerides 131 See Comment mg/dL    HDL, Direct 50 >=40 mg/dL    LDL Calculated 74 0 - 100 mg/dL    Non-HDL-Chol (CHOL-HDL) 100 mg/dl   TSH, 3rd generation with Free T4 reflex    Collection Time: 09/20/24  7:50 AM   Result Value Ref Range    TSH 3RD GENERATON 4.880 (H) 0.450 - 4.500 uIU/mL   T4, free    Collection Time: 09/20/24  7:50 AM   Result Value Ref Range    Free T4 0.82 0.61 - 1.12 ng/dL       The following portions of the patient's history were reviewed and updated as appropriate: allergies, current medications, past family history, past medical history, past social history, past surgical history and problem list.     Review of Systems   Constitutional:  Negative for appetite change, chills, diaphoresis, fatigue, fever and unexpected weight change.   HENT:  Negative for congestion, hearing loss and rhinorrhea.    Eyes:  Negative for visual disturbance.   Respiratory:  Negative for cough, chest tightness, shortness of breath and wheezing.    Cardiovascular:  Negative for chest pain, palpitations and leg swelling.   Gastrointestinal:  Negative for abdominal pain and blood in stool.   Endocrine: Negative for cold intolerance, heat intolerance, polydipsia and polyuria.   Genitourinary:  Negative for difficulty  urinating, dysuria, frequency and urgency.   Musculoskeletal:  Negative for arthralgias and myalgias.   Skin:  Negative for rash.   Neurological:  Negative for dizziness, weakness, light-headedness and headaches.   Hematological:  Does not bruise/bleed easily.   Psychiatric/Behavioral:  Negative for dysphoric mood and sleep disturbance.          Objective:      Vitals:    10/02/24 1524   BP: 126/86   Pulse: 75   Temp: 98.9 °F (37.2 °C)   SpO2: 96%          Physical Exam  Constitutional:       Appearance: He is well-developed.   HENT:      Head: Normocephalic and atraumatic.      Nose: Nose normal.   Eyes:      General: No scleral icterus.     Conjunctiva/sclera: Conjunctivae normal.      Pupils: Pupils are equal, round, and reactive to light.   Neck:      Thyroid: No thyromegaly.      Vascular: No JVD.      Trachea: No tracheal deviation.   Cardiovascular:      Rate and Rhythm: Normal rate and regular rhythm.      Heart sounds: No murmur heard.     No friction rub. No gallop.   Pulmonary:      Effort: Pulmonary effort is normal. No respiratory distress.      Breath sounds: Normal breath sounds. No wheezing or rales.   Abdominal:      General: Bowel sounds are normal. There is no distension.      Palpations: Abdomen is soft. There is no mass.      Tenderness: There is no abdominal tenderness. There is no guarding or rebound.   Musculoskeletal:         General: No tenderness.      Cervical back: Normal range of motion and neck supple.   Lymphadenopathy:      Cervical: No cervical adenopathy.   Skin:     General: Skin is warm and dry.      Findings: No erythema or rash.   Neurological:      Mental Status: He is alert and oriented to person, place, and time.      Cranial Nerves: No cranial nerve deficit.   Psychiatric:         Behavior: Behavior normal.         Thought Content: Thought content normal.         Judgment: Judgment normal.

## 2024-10-09 ENCOUNTER — TELEPHONE (OUTPATIENT)
Dept: SLEEP CENTER | Facility: CLINIC | Age: 54
End: 2024-10-09

## 2024-10-09 NOTE — TELEPHONE ENCOUNTER
Home sleep study resulted and shows mild sleep apnea with central apneas observed. Respiratory event related hypoxemia also noted. NICHOLE 12.9. CPAP titration study ordered.       Call to patient reviewed results and recommendation for CPAP titration study.     Study scheduled for 2/2/2024 in the [place] sleep lab. Offered patient tonight but he is in [place] and getting back would be tight. Reviewed requirments and cancellation policy, patient understands.     Compliance appointment rescheduled from 11/6 to 2/20/2025 with Dr. Husain.

## 2024-10-14 ENCOUNTER — TELEPHONE (OUTPATIENT)
Age: 54
End: 2024-10-14

## 2024-10-16 DIAGNOSIS — Z12.5 SCREENING FOR PROSTATE CANCER: Primary | ICD-10-CM

## 2024-10-18 ENCOUNTER — HOSPITAL ENCOUNTER (OUTPATIENT)
Dept: SLEEP CENTER | Facility: CLINIC | Age: 54
Discharge: HOME | End: 2024-10-18
Payer: COMMERCIAL

## 2024-10-18 DIAGNOSIS — G47.33 OSA (OBSTRUCTIVE SLEEP APNEA): ICD-10-CM

## 2024-10-18 PROCEDURE — 95811 POLYSOM 6/>YRS CPAP 4/> PARM: CPT | Performed by: PSYCHIATRY & NEUROLOGY

## 2024-10-18 PROCEDURE — 95811 POLYSOM 6/>YRS CPAP 4/> PARM: CPT

## 2024-10-19 NOTE — PROGRESS NOTES
Sleep Study Documentation    Pre-Sleep Study       Sleep testing procedure explained to patient:YES    Patient napped prior to study:NO    Caffeine:Dayshift worker after 12PM.  Caffeine use:YES- coffee  6 ounces and soda  12 ounces    Alcohol:Dayshift workers after 5PM: Alcohol use:NO    Typical day for patient:YES       Study Documentation    Sleep Study Indications: Home Study with NICHOLE > 5, Excessive daytime sleepiness, BMI > 30, ERIKA    Sleep Study: Treatment   Optimal PAP pressure: 7 cmH2O  Leak:Small  Snore:Eliminated  REM Obtained:yes  Supplemental O2: no    Minimum SaO2 87%  Baseline SaO2 98%  PAP mask tried (list all) medium Airfit Resmed F20 full face mask interface  PAP mask choice (final) medium Airfit Resmed F20 full face mask interface  PAP mask type:full face  PAP pressure at which snoring was eliminated 7 cmH2O  Minimum SaO2 at final PAP pressure 93%  Mode of Therapy:CPAP  ETCO2:No  CPAP changed to BiPAP:No    Mode of Therapy:CPAP    EKG abnormalities: yes:  EPOCH example and comments: [phone],    EEG abnormalities: no    Were abnormal behaviors in sleep observed:NO    Is Total Sleep Study Recording Time < 2 hours: N/A    Is Total Sleep Study Recording Time > 2 hours but study is incomplete: N/A    Is Total Sleep Study Recording Time 6 hours or more but sleep was not obtained: NO    Patient classification: employed       Post-Sleep Study    Medication used at bedtime or during sleep study:NO    Patient reports time it took to fall asleep:20 to 30 minutes    Patient reports waking up during study:1 to 2 times.  Patient reports returning to sleep without difficulty.    Patient reports sleeping 4 to 6 hours with dreaming.    Does the Patient feel this is a typical night of sleep:better than usual    Patient rated sleepiness: Very sleepy or tired    PAP treatment:yes: Post PAP treatment patient reports feeling better and  would wear PAP mask at home.

## 2024-10-24 DIAGNOSIS — G47.33 OSA (OBSTRUCTIVE SLEEP APNEA): Primary | ICD-10-CM

## 2024-10-29 ENCOUNTER — TELEPHONE (OUTPATIENT)
Dept: SLEEP CENTER | Facility: CLINIC | Age: 54
End: 2024-10-29

## 2024-10-29 DIAGNOSIS — G47.33 OSA (OBSTRUCTIVE SLEEP APNEA): Primary | ICD-10-CM

## 2024-10-29 NOTE — TELEPHONE ENCOUNTER
CPAP titration study resulted and shows effective study resulting in AHI 0.4.     ncoming call from patient stating he had his sleep study done last week and saw the results.      Reviewed results with patient. He states he has heard from [facility] and they are going to send him CPAP and then call him on 12/5/2024, he is not sure why.     Compliance appointment rescheduled to 12/19 @ 3:30 pm in the [place] office.     New CPAP order placed by Dr. Machado with pressure range of 5-11 cmH2O.      Previous CPAP order placed by Dr. Husain has pressure range of 5-15 cmH2O.     Patient would like to be set up with CPAP in [place] sleep office.     Routed to Dr. Machado, request cancel CPAP order and place pressure change order.     Need:    Send CPAP study and new script to [facility].     [facility] at [place] sleep office.

## 2024-10-31 LAB
DME PARACHUTE DELIVERY DATE ACTUAL: NORMAL
DME PARACHUTE DELIVERY DATE REQUESTED: NORMAL
DME PARACHUTE ITEM DESCRIPTION: NORMAL
DME PARACHUTE ORDER STATUS: NORMAL
DME PARACHUTE SUPPLIER NAME: NORMAL
DME PARACHUTE SUPPLIER PHONE: NORMAL

## 2024-11-26 LAB
DME PARACHUTE DELIVERY DATE ACTUAL: NORMAL
DME PARACHUTE DELIVERY DATE EXPECTED: NORMAL
DME PARACHUTE DELIVERY DATE REQUESTED: NORMAL
DME PARACHUTE ITEM DESCRIPTION: NORMAL
DME PARACHUTE ORDER STATUS: NORMAL
DME PARACHUTE SUPPLIER NAME: NORMAL
DME PARACHUTE SUPPLIER PHONE: NORMAL

## 2025-05-28 ENCOUNTER — OFFICE VISIT (OUTPATIENT)
Dept: CARDIOLOGY CLINIC | Facility: CLINIC | Age: 55
End: 2025-05-28
Payer: COMMERCIAL

## 2025-05-28 VITALS
HEIGHT: 71 IN | OXYGEN SATURATION: 97 % | SYSTOLIC BLOOD PRESSURE: 120 MMHG | WEIGHT: 222 LBS | BODY MASS INDEX: 31.08 KG/M2 | DIASTOLIC BLOOD PRESSURE: 66 MMHG | HEART RATE: 66 BPM

## 2025-05-28 DIAGNOSIS — I44.5 LEFT POSTERIOR FASCICULAR BLOCK: Primary | ICD-10-CM

## 2025-05-28 DIAGNOSIS — Z91.89 CARDIOVASCULAR RISK FACTOR: ICD-10-CM

## 2025-05-28 LAB
ATRIAL RATE: 66 BPM
P AXIS: 53 DEGREES
PR INTERVAL: 138 MS
QRS AXIS: 101 DEGREES
QRSD INTERVAL: 94 MS
QT INTERVAL: 402 MS
QTC INTERVAL: 421 MS
T WAVE AXIS: 38 DEGREES
VENTRICULAR RATE: 66 BPM

## 2025-05-28 PROCEDURE — 99214 OFFICE O/P EST MOD 30 MIN: CPT | Performed by: INTERNAL MEDICINE

## 2025-05-28 PROCEDURE — 93000 ELECTROCARDIOGRAM COMPLETE: CPT | Performed by: INTERNAL MEDICINE

## 2025-05-28 NOTE — PROGRESS NOTES
Talha NADIYA Mandelin  1970  [id number]  [facility] [address] [id number] [phone]    1. Left posterior fascicular block  POCT ECG    CT coronary calcium score      2. Cardiovascular risk factor  POCT ECG    CT coronary calcium score          Discussion/Summary: Overall he is feeling well with a good functional capacity.  Denies any symptoms.  We ordered a calcium score to help risk stratify him further.  Left posterior fascicular block on EKG no significant change.  Blood pressure is doing well he will be due for lipids in the fall.  Follow-up 1 year.    Interval History:  54-year-old gentleman with no significant past medical history presents to establish with me for cardiovascular risk assessment.  I took care of his father several years ago and he had a history of valvular disease and AAA and the patient wanted to have his own risk factors for these assessed.    Overall he has been doing well minimal palpitations.  Functional capacity is good.  Denies any chest pain, shortness of breath, palpitations, lightheadedness, dizziness, or syncope.  Has been lower extreme edema, PND, orthopnea.  No claudication.  Medical Problems                 Problem List       Rectal bleeding    Encounter for screening for malignant neoplasm of colon    Overview Signed 2/4/2019  2:12 PM by Ramon Tovar     Added automatically from request for surgery [id number]           Hemorrhage of anus and rectum    Overview Signed 2/15/2019 11:45 AM by Ramon Tovar     Added automatically from request for surgery [id number]           Cardiovascular risk factor    Left posterior fascicular block                  No past medical history on file.  Social History     Socioeconomic History    Marital status: /Civil Union     Spouse name: Not on file    Number of children: Not on file    Years of education: Not on file    Highest education level: Not on file   Occupational  History    Not on file   Tobacco Use    Smoking status: Never    Smokeless tobacco: Never   Vaping Use    Vaping status: Never Used   Substance and Sexual Activity    Alcohol use: No    Drug use: No    Sexual activity: Yes     Partners: Female   Other Topics Concern    Not on file   Social History Narrative    Not on file     Social Drivers of Health     Financial Resource Strain: Not on file   Food Insecurity: Not on file   Transportation Needs: Not on file   Physical Activity: Not on file   Stress: Not on file   Social Connections: Unknown (6/18/2024)    Received from [employer]    Social Hupu     How often do you feel lonely or isolated from those around you? (Adult - for ages 18 years and over): Not on file   Intimate Partner Violence: Not on file   Housing Stability: Not on file      Family History   Problem Relation Name Age of Onset    Diabetes Family       Past Surgical History:   Procedure Laterality Date    SC COLONOSCOPY FLX DX W/COLLJ SPEC WHEN PFRMD N/A 2/18/2019    Procedure: COLONOSCOPY;  Surgeon: Josh Wren III, MD;  Location: [facility];  Service: Gastroenterology     No current outpatient medications on file.  No Known Allergies    Labs:     Chemistry        Component Value Date/Time    K 4.1 09/20/2024 0750    K 4.5 04/17/2023 1055    K 4.4 11/03/2022 1046     09/20/2024 0750     04/17/2023 1055     11/03/2022 1046    CO2 25 09/20/2024 0750    CO2 27 04/17/2023 1055    CO2 27 11/03/2022 1046    BUN 14 09/20/2024 0750    BUN 17 04/17/2023 1055    BUN 15 11/03/2022 1046    BUN 15 06/05/2020 1304    CREATININE 1.03 09/20/2024 0750    CREATININE 1.10 11/03/2022 1046        Component Value Date/Time    CALCIUM 9.2 09/20/2024 0750    CALCIUM 9.4 04/17/2023 1055    CALCIUM 9.7 11/03/2022 1046    ALKPHOS 63 09/20/2024 0750    ALKPHOS 64 04/17/2023 1055    ALKPHOS 71 11/03/2022 1046    AST 19 09/20/2024 0750    AST 21 04/17/2023 1055    AST 19 11/03/2022 1046    ALT 22  "09/20/2024 0750    ALT 20 04/17/2023 1055    ALT 17 11/03/2022 1046            No results found for: \"CHOL\"  Lab Results   Component Value Date    HDL 50 09/20/2024    HDL 52 12/15/2023     Lab Results   Component Value Date    LDLCALC 74 09/20/2024    LDLCALC 68 12/15/2023     Lab Results   Component Value Date    TRIG 131 09/20/2024    TRIG 155 (H) 12/15/2023     No results found for: \"CHOLHDL\"    Imaging: No results found.    ECG:  Normal sinus rhythm left posterior fascicular block      Review of Systems   Constitutional: Negative.   HENT: Negative.     Eyes: Negative.    Cardiovascular: Negative.    Respiratory: Negative.     Endocrine: Negative.    Hematologic/Lymphatic: Negative.    Skin: Negative.    Musculoskeletal: Negative.    Gastrointestinal: Negative.    Genitourinary: Negative.    Neurological: Negative.    Psychiatric/Behavioral: Negative.     All other systems reviewed and are negative.      Vitals:    05/28/25 1007   BP: 120/66   Pulse: 66   SpO2: 97%     Vitals:    05/28/25 1007   Weight: 101 kg (222 lb)     Height: 5' 11\" (180.3 cm)   Body mass index is 30.96 kg/m².    Physical Exam:  Vital signs reviewed  General:  Alert and cooperative, appears stated age, no acute distress  HEENT:  PERRLA, EOMI, no scleral icterus, no conjunctival pallor  Neck:  No lymphadenopathy, no thyromegaly, no carotid bruits, no elevated JVP  Heart:  Regular rate and rhythm, normal S1/S2, no S3/S4, no murmur, rubs or gallops.  PMI nondisplaced  Lungs:  Clear to auscultation bilaterally, no wheezes rales or rhonchi  Abdomen:  Soft, non-tender, positive bowel sounds, no rebound or guarding,   no organomegaly   Extremities:  Normal range of motion.  No clubbing, cyanosis or edema   Vascular:  2+ pedal pulses  Skin:  No rashes or lesions on exposed skin  Neurologic:  Cranial nerves II-XII grossly intact without focal deficits  Psych:  Normal mood and affect          "

## 2025-05-29 ENCOUNTER — HOSPITAL ENCOUNTER (OUTPATIENT)
Dept: CT IMAGING | Facility: CLINIC | Age: 55
Discharge: HOME | End: 2025-05-29
Attending: INTERNAL MEDICINE
Payer: COMMERCIAL

## 2025-05-29 DIAGNOSIS — Z91.89 CARDIOVASCULAR RISK FACTOR: ICD-10-CM

## 2025-05-29 DIAGNOSIS — I44.5 LEFT POSTERIOR FASCICULAR BLOCK: ICD-10-CM

## 2025-05-29 PROCEDURE — 75571 CT HRT W/O DYE W/CA TEST: CPT
